# Patient Record
Sex: MALE | Race: ASIAN | NOT HISPANIC OR LATINO | URBAN - METROPOLITAN AREA
[De-identification: names, ages, dates, MRNs, and addresses within clinical notes are randomized per-mention and may not be internally consistent; named-entity substitution may affect disease eponyms.]

---

## 2021-07-24 ENCOUNTER — INPATIENT (INPATIENT)
Facility: HOSPITAL | Age: 71
LOS: 2 days | Discharge: HOME | DRG: 313 | End: 2021-07-27
Attending: HOSPITALIST | Admitting: STUDENT IN AN ORGANIZED HEALTH CARE EDUCATION/TRAINING PROGRAM
Payer: MEDICARE

## 2021-07-24 VITALS
RESPIRATION RATE: 18 BRPM | SYSTOLIC BLOOD PRESSURE: 173 MMHG | HEIGHT: 70 IN | DIASTOLIC BLOOD PRESSURE: 94 MMHG | HEART RATE: 73 BPM | OXYGEN SATURATION: 98 % | TEMPERATURE: 99 F | WEIGHT: 197.98 LBS

## 2021-07-24 DIAGNOSIS — J30.2 OTHER SEASONAL ALLERGIC RHINITIS: ICD-10-CM

## 2021-07-24 DIAGNOSIS — R07.9 CHEST PAIN, UNSPECIFIED: ICD-10-CM

## 2021-07-24 DIAGNOSIS — Z79.899 OTHER LONG TERM (CURRENT) DRUG THERAPY: ICD-10-CM

## 2021-07-24 DIAGNOSIS — Z29.9 ENCOUNTER FOR PROPHYLACTIC MEASURES, UNSPECIFIED: ICD-10-CM

## 2021-07-24 LAB
ALBUMIN SERPL ELPH-MCNC: 4.4 G/DL — SIGNIFICANT CHANGE UP (ref 3.3–5)
ALP SERPL-CCNC: 63 U/L — SIGNIFICANT CHANGE UP (ref 40–120)
ALT FLD-CCNC: 13 U/L — SIGNIFICANT CHANGE UP (ref 10–45)
ANION GAP SERPL CALC-SCNC: 13 MMOL/L — SIGNIFICANT CHANGE UP (ref 5–17)
AST SERPL-CCNC: 14 U/L — SIGNIFICANT CHANGE UP (ref 10–40)
BASOPHILS # BLD AUTO: 0.05 K/UL — SIGNIFICANT CHANGE UP (ref 0–0.2)
BASOPHILS NFR BLD AUTO: 0.7 % — SIGNIFICANT CHANGE UP (ref 0–2)
BILIRUB SERPL-MCNC: 0.2 MG/DL — SIGNIFICANT CHANGE UP (ref 0.2–1.2)
BUN SERPL-MCNC: 10 MG/DL — SIGNIFICANT CHANGE UP (ref 7–23)
CALCIUM SERPL-MCNC: 9.9 MG/DL — SIGNIFICANT CHANGE UP (ref 8.4–10.5)
CHLORIDE SERPL-SCNC: 102 MMOL/L — SIGNIFICANT CHANGE UP (ref 96–108)
CK MB BLD-MCNC: 2.6 % — SIGNIFICANT CHANGE UP (ref 0–3.5)
CK MB CFR SERPL CALC: 4.2 NG/ML — SIGNIFICANT CHANGE UP (ref 0–6.7)
CK SERPL-CCNC: 162 U/L — SIGNIFICANT CHANGE UP (ref 30–200)
CO2 SERPL-SCNC: 23 MMOL/L — SIGNIFICANT CHANGE UP (ref 22–31)
CREAT SERPL-MCNC: 0.91 MG/DL — SIGNIFICANT CHANGE UP (ref 0.5–1.3)
EOSINOPHIL # BLD AUTO: 0.24 K/UL — SIGNIFICANT CHANGE UP (ref 0–0.5)
EOSINOPHIL NFR BLD AUTO: 3.3 % — SIGNIFICANT CHANGE UP (ref 0–6)
GLUCOSE SERPL-MCNC: 120 MG/DL — HIGH (ref 70–99)
HCT VFR BLD CALC: 43.4 % — SIGNIFICANT CHANGE UP (ref 39–50)
HGB BLD-MCNC: 13.6 G/DL — SIGNIFICANT CHANGE UP (ref 13–17)
IMM GRANULOCYTES NFR BLD AUTO: 0.4 % — SIGNIFICANT CHANGE UP (ref 0–1.5)
LYMPHOCYTES # BLD AUTO: 1.1 K/UL — SIGNIFICANT CHANGE UP (ref 1–3.3)
LYMPHOCYTES # BLD AUTO: 15.2 % — SIGNIFICANT CHANGE UP (ref 13–44)
MAGNESIUM SERPL-MCNC: 2.1 MG/DL — SIGNIFICANT CHANGE UP (ref 1.6–2.6)
MCHC RBC-ENTMCNC: 25.4 PG — LOW (ref 27–34)
MCHC RBC-ENTMCNC: 31.3 GM/DL — LOW (ref 32–36)
MCV RBC AUTO: 81 FL — SIGNIFICANT CHANGE UP (ref 80–100)
MONOCYTES # BLD AUTO: 0.7 K/UL — SIGNIFICANT CHANGE UP (ref 0–0.9)
MONOCYTES NFR BLD AUTO: 9.7 % — SIGNIFICANT CHANGE UP (ref 2–14)
NEUTROPHILS # BLD AUTO: 5.12 K/UL — SIGNIFICANT CHANGE UP (ref 1.8–7.4)
NEUTROPHILS NFR BLD AUTO: 70.7 % — SIGNIFICANT CHANGE UP (ref 43–77)
NRBC # BLD: 0 /100 WBCS — SIGNIFICANT CHANGE UP (ref 0–0)
NT-PROBNP SERPL-SCNC: 19 PG/ML — SIGNIFICANT CHANGE UP (ref 0–300)
PLATELET # BLD AUTO: 247 K/UL — SIGNIFICANT CHANGE UP (ref 150–400)
POTASSIUM SERPL-MCNC: 4.6 MMOL/L — SIGNIFICANT CHANGE UP (ref 3.5–5.3)
POTASSIUM SERPL-SCNC: 4.6 MMOL/L — SIGNIFICANT CHANGE UP (ref 3.5–5.3)
PROT SERPL-MCNC: 7.3 G/DL — SIGNIFICANT CHANGE UP (ref 6–8.3)
RBC # BLD: 5.36 M/UL — SIGNIFICANT CHANGE UP (ref 4.2–5.8)
RBC # FLD: 14.9 % — HIGH (ref 10.3–14.5)
SARS-COV-2 RNA SPEC QL NAA+PROBE: SIGNIFICANT CHANGE UP
SODIUM SERPL-SCNC: 138 MMOL/L — SIGNIFICANT CHANGE UP (ref 135–145)
TROPONIN T, HIGH SENSITIVITY RESULT: <6 NG/L — SIGNIFICANT CHANGE UP (ref 0–51)
TROPONIN T, HIGH SENSITIVITY RESULT: <6 NG/L — SIGNIFICANT CHANGE UP (ref 0–51)
WBC # BLD: 7.24 K/UL — SIGNIFICANT CHANGE UP (ref 3.8–10.5)
WBC # FLD AUTO: 7.24 K/UL — SIGNIFICANT CHANGE UP (ref 3.8–10.5)

## 2021-07-24 PROCEDURE — 71045 X-RAY EXAM CHEST 1 VIEW: CPT | Mod: 26

## 2021-07-24 PROCEDURE — 99285 EMERGENCY DEPT VISIT HI MDM: CPT | Mod: 25

## 2021-07-24 PROCEDURE — 99222 1ST HOSP IP/OBS MODERATE 55: CPT

## 2021-07-24 PROCEDURE — 93010 ELECTROCARDIOGRAM REPORT: CPT

## 2021-07-24 RX ORDER — OMEPRAZOLE 10 MG/1
1 CAPSULE, DELAYED RELEASE ORAL
Qty: 0 | Refills: 0 | DISCHARGE

## 2021-07-24 RX ORDER — ASPIRIN/CALCIUM CARB/MAGNESIUM 324 MG
162 TABLET ORAL ONCE
Refills: 0 | Status: COMPLETED | OUTPATIENT
Start: 2021-07-24 | End: 2021-07-24

## 2021-07-24 RX ORDER — MONTELUKAST 4 MG/1
10 TABLET, CHEWABLE ORAL DAILY
Refills: 0 | Status: DISCONTINUED | OUTPATIENT
Start: 2021-07-24 | End: 2021-07-27

## 2021-07-24 RX ORDER — BUDESONIDE AND FORMOTEROL FUMARATE DIHYDRATE 160; 4.5 UG/1; UG/1
2 AEROSOL RESPIRATORY (INHALATION)
Refills: 0 | Status: DISCONTINUED | OUTPATIENT
Start: 2021-07-24 | End: 2021-07-27

## 2021-07-24 RX ORDER — PANTOPRAZOLE SODIUM 20 MG/1
40 TABLET, DELAYED RELEASE ORAL
Refills: 0 | Status: DISCONTINUED | OUTPATIENT
Start: 2021-07-24 | End: 2021-07-27

## 2021-07-24 RX ORDER — FLUTICASONE FUROATE, UMECLIDINIUM BROMIDE AND VILANTEROL TRIFENATATE 200; 62.5; 25 UG/1; UG/1; UG/1
1 POWDER RESPIRATORY (INHALATION)
Qty: 0 | Refills: 0 | DISCHARGE

## 2021-07-24 RX ORDER — MONTELUKAST 4 MG/1
1 TABLET, CHEWABLE ORAL
Qty: 0 | Refills: 0 | DISCHARGE

## 2021-07-24 RX ORDER — ONDANSETRON 8 MG/1
4 TABLET, FILM COATED ORAL EVERY 8 HOURS
Refills: 0 | Status: DISCONTINUED | OUTPATIENT
Start: 2021-07-24 | End: 2021-07-27

## 2021-07-24 RX ORDER — TIOTROPIUM BROMIDE 18 UG/1
1 CAPSULE ORAL; RESPIRATORY (INHALATION) DAILY
Refills: 0 | Status: DISCONTINUED | OUTPATIENT
Start: 2021-07-24 | End: 2021-07-27

## 2021-07-24 RX ORDER — ENOXAPARIN SODIUM 100 MG/ML
40 INJECTION SUBCUTANEOUS AT BEDTIME
Refills: 0 | Status: DISCONTINUED | OUTPATIENT
Start: 2021-07-24 | End: 2021-07-27

## 2021-07-24 RX ORDER — ACETAMINOPHEN 500 MG
650 TABLET ORAL EVERY 6 HOURS
Refills: 0 | Status: DISCONTINUED | OUTPATIENT
Start: 2021-07-24 | End: 2021-07-26

## 2021-07-24 RX ORDER — LANOLIN ALCOHOL/MO/W.PET/CERES
3 CREAM (GRAM) TOPICAL AT BEDTIME
Refills: 0 | Status: DISCONTINUED | OUTPATIENT
Start: 2021-07-24 | End: 2021-07-27

## 2021-07-24 RX ADMIN — MONTELUKAST 10 MILLIGRAM(S): 4 TABLET, CHEWABLE ORAL at 22:55

## 2021-07-24 RX ADMIN — TIOTROPIUM BROMIDE 1 CAPSULE(S): 18 CAPSULE ORAL; RESPIRATORY (INHALATION) at 22:59

## 2021-07-24 RX ADMIN — ENOXAPARIN SODIUM 40 MILLIGRAM(S): 100 INJECTION SUBCUTANEOUS at 22:54

## 2021-07-24 RX ADMIN — BUDESONIDE AND FORMOTEROL FUMARATE DIHYDRATE 2 PUFF(S): 160; 4.5 AEROSOL RESPIRATORY (INHALATION) at 22:55

## 2021-07-24 RX ADMIN — PANTOPRAZOLE SODIUM 40 MILLIGRAM(S): 20 TABLET, DELAYED RELEASE ORAL at 12:32

## 2021-07-24 RX ADMIN — Medication 162 MILLIGRAM(S): at 03:44

## 2021-07-24 NOTE — ED ADULT TRIAGE NOTE - CHIEF COMPLAINT QUOTE
chest pain on and off x 24 hours.  patient says worse with palpation but woke up sweating tonight so became concerned

## 2021-07-24 NOTE — ED ADULT NURSE NOTE - OBJECTIVE STATEMENT
70 y old male with no significant history presents to the ED from home c/o chest pain x 1.5 hours. Pt reports chest pain woke him from his sleep and he was diaphoretic. Pt states pain is left sided and nonradiating. Denies HA, fevers, chills, SOB, abd pain, n/v/d, weakness. Pt A&O x 4, ambulatory. Respirations spontaneous and unlabored. Abd soft, nontender and nondistended. Peripheral pulses strong. Skin warm, dry and intact. Bed in lowest position, side rails up and call bell in reach. Pt placed on cardiac monitor.

## 2021-07-24 NOTE — ED PROVIDER NOTE - NS ED ROS FT
Gen: Denies fever, chills  CV: +chest pain  Skin: Denies rash, erythema  Resp: Denies SOB, cough  ENT: Denies nasal congestion  Eyes: Denies blurry vision  GI: Denies nausea, vomiting, diarrhea  Msk: Denies LE swelling  : Denies dysuria  Neuro: Denies headache

## 2021-07-24 NOTE — CONSULT NOTE ADULT - SUBJECTIVE AND OBJECTIVE BOX
CHIEF COMPLAINT:Patient is a 70y old  Male who presents with a chief complaint of chest pain on and off x 24 hours (24 Jul 2021 09:08)      HISTORY OF PRESENT ILLNESS:HPI:  71 yo M with no sig pmhx presents with 2ds of intermittent chest pain, woke up with chest pain and diaphoresis. Pain improving at presentation.  As per patient he was doing well, performing his ADL until 2 days ago when he noticed a left sided pressure like pain rated 2/10 which lasted for 1-3 sec that was recurrent and associated with diaphoresis .  NO nausea, vomiting or LUE numbness .  NO pain radiation, pain as he states is worse with pressure on the area.  Pt states that he had chest pain in 9/2020 and had a neg stress echo and was told to see a pulm who in term prescribed him Trelegy and singulair.  NO dyspnea no palpitations    Last stress and echo was about a yr ago - normal. No fever, chills, abd pain, nausea, vomiting, diarrhea. did not take asa.      ED : Patient received ASA 162mg  (24 Jul 2021 09:08)      PAST MEDICAL & SURGICAL HISTORY:          MEDICATIONS:  enoxaparin Injectable 40 milliGRAM(s) SubCutaneous at bedtime      budesonide  80 MICROgram(s)/formoterol 4.5 MICROgram(s) Inhaler 2 Puff(s) Inhalation two times a day  montelukast 10 milliGRAM(s) Oral daily  tiotropium 18 MICROgram(s) Capsule 1 Capsule(s) Inhalation daily    acetaminophen   Tablet .. 650 milliGRAM(s) Oral every 6 hours PRN  melatonin 3 milliGRAM(s) Oral at bedtime PRN  ondansetron Injectable 4 milliGRAM(s) IV Push every 8 hours PRN    aluminum hydroxide/magnesium hydroxide/simethicone Suspension 30 milliLiter(s) Oral every 4 hours PRN  pantoprazole    Tablet 40 milliGRAM(s) Oral before breakfast          FAMILY HISTORY:      Non-contributory    SOCIAL HISTORY:    [ ] Tobacco  [ ] Drugs  [ ] Alcohol    Allergies    No Known Allergies    Intolerances    	    REVIEW OF SYSTEMS:  CONSTITUTIONAL: No fever  EYES: No eye pain, visual disturbances, or discharge  ENMT:  No difficulty hearing, tinnitus  NECK: No pain or stiffness  RESPIRATORY: No cough, wheezing,  CARDIOVASCULAR: No chest pain, palpitations, passing out, dizziness, or leg swelling  GASTROINTESTINAL:  No nausea, vomiting, diarrhea or constipation. No melena.  GENITOURINARY: No dysuria, hematuria  NEUROLOGICAL: No stroke like symptoms  SKIN: No burning or lesions   ENDOCRINE: No heat or cold intolerance  MUSCULOSKELETAL: No joint pain or swelling  PSYCHIATRIC: No  anxiety, mood swings  HEME/LYMPH: No bleeding gums  ALLERGY AND IMMUNOLOGIC: No hives or eczema	    All other ROS negative    PHYSICAL EXAM:  T(C): 36.8 (07-24-21 @ 11:53), Max: 37.1 (07-24-21 @ 02:01)  HR: 97 (07-24-21 @ 11:53) (58 - 97)  BP: 136/81 (07-24-21 @ 11:53) (136/81 - 173/94)  RR: 18 (07-24-21 @ 11:53) (14 - 18)  SpO2: 99% (07-24-21 @ 11:53) (98% - 99%)  Wt(kg): --  I&O's Summary      Appearance: Normal	  HEENT:   Normal oral mucosa, EOMI	  Cardiovascular:  S1 S2, No JVD,    Respiratory: Lungs clear to auscultation	  Psychiatry: Alert  Gastrointestinal:  Soft, Non-tender, + BS	  Skin: No rashes   Neurologic: Non-focal  Extremities:  No edema  Vascular: Peripheral pulses palpable    	    	  	  CARDIAC MARKERS:  Labs personally reviewed by me                                  13.6   7.24  )-----------( 247      ( 24 Jul 2021 03:46 )             43.4     07-24    138  |  102  |  10  ----------------------------<  120<H>  4.6   |  23  |  0.91    Ca    9.9      24 Jul 2021 03:46  Mg     2.1     07-24    TPro  7.3  /  Alb  4.4  /  TBili  0.2  /  DBili  x   /  AST  14  /  ALT  13  /  AlkPhos  63  07-24          EKG: Personally reviewed by me -   Radiology: Personally reviewed by me -   < from: Xray Chest 1 View- PORTABLE-Urgent (07.24.21 @ 03:59) >  IMPRESSION:  Clear lungs.    < end of copied text >      Assessment /Plan:         Dayana Snyedr [facility]   Michael Esquivel DO [facility]  Cardiovascular Medicine  [address] [place] [address], [address]  Office [phone]  Cell [phone] CHIEF COMPLAINT:Patient is a 70y old  Male who presents with a chief complaint of chest pain on and off x 24 hours (24 Jul 2021 09:08)      HISTORY OF PRESENT ILLNESS:HPI:  71 yo M with no sig pmhx presents with 2ds of intermittent chest pain, woke up with chest pain and diaphoresis. Pain improving at presentation.  As per patient he was doing well, performing his ADL until 2 days ago when he noticed a left sided pressure like pain rated 2/10 which lasted for 1-3 sec that was recurrent and associated with diaphoresis .  NO nausea, vomiting or LUE numbness .  NO pain radiation, pain as he states is worse with pressure on the area.  Pt states that he had chest pain in 9/2020 and had a neg stress echo and was told to see a pulm who in term prescribed him Trelegy and singulair.  NO dyspnea no palpitations    Last stress and echo was about a yr ago - normal. No fever, chills, abd pain, nausea, vomiting, diarrhea. did not take asa.      ED : Patient received ASA 162mg  (24 Jul 2021 09:08)      PAST MEDICAL & SURGICAL HISTORY:          MEDICATIONS:  enoxaparin Injectable 40 milliGRAM(s) SubCutaneous at bedtime      budesonide  80 MICROgram(s)/formoterol 4.5 MICROgram(s) Inhaler 2 Puff(s) Inhalation two times a day  montelukast 10 milliGRAM(s) Oral daily  tiotropium 18 MICROgram(s) Capsule 1 Capsule(s) Inhalation daily    acetaminophen   Tablet .. 650 milliGRAM(s) Oral every 6 hours PRN  melatonin 3 milliGRAM(s) Oral at bedtime PRN  ondansetron Injectable 4 milliGRAM(s) IV Push every 8 hours PRN    aluminum hydroxide/magnesium hydroxide/simethicone Suspension 30 milliLiter(s) Oral every 4 hours PRN  pantoprazole    Tablet 40 milliGRAM(s) Oral before breakfast          FAMILY HISTORY:      Non-contributory    SOCIAL HISTORY:    [ ] Tobacco  [ ] Drugs  [ ] Alcohol    Allergies    No Known Allergies    Intolerances    	    REVIEW OF SYSTEMS:  CONSTITUTIONAL: No fever  EYES: No eye pain, visual disturbances, or discharge  ENMT:  No difficulty hearing, tinnitus  NECK: No pain or stiffness  RESPIRATORY: No cough, wheezing,  CARDIOVASCULAR: No chest pain, palpitations, passing out, dizziness, or leg swelling  GASTROINTESTINAL:  No nausea, vomiting, diarrhea or constipation. No melena.  GENITOURINARY: No dysuria, hematuria  NEUROLOGICAL: No stroke like symptoms  SKIN: No burning or lesions   ENDOCRINE: No heat or cold intolerance  MUSCULOSKELETAL: No joint pain or swelling  PSYCHIATRIC: No  anxiety, mood swings  HEME/LYMPH: No bleeding gums  ALLERGY AND IMMUNOLOGIC: No hives or eczema	    All other ROS negative    PHYSICAL EXAM:  T(C): 36.8 (07-24-21 @ 11:53), Max: 37.1 (07-24-21 @ 02:01)  HR: 97 (07-24-21 @ 11:53) (58 - 97)  BP: 136/81 (07-24-21 @ 11:53) (136/81 - 173/94)  RR: 18 (07-24-21 @ 11:53) (14 - 18)  SpO2: 99% (07-24-21 @ 11:53) (98% - 99%)  Wt(kg): --  I&O's Summary      Appearance: Normal	  HEENT:   Normal oral mucosa, EOMI	  Cardiovascular:  S1 S2, No JVD,    Respiratory: Lungs clear to auscultation	  Psychiatry: Alert  Gastrointestinal:  Soft, Non-tender, + BS	  Skin: No rashes   Neurologic: Non-focal  Extremities:  No edema  Vascular: Peripheral pulses palpable    	    	  	  CARDIAC MARKERS:  Labs personally reviewed by me                                  13.6   7.24  )-----------( 247      ( 24 Jul 2021 03:46 )             43.4     07-24    138  |  102  |  10  ----------------------------<  120<H>  4.6   |  23  |  0.91    Ca    9.9      24 Jul 2021 03:46  Mg     2.1     07-24    TPro  7.3  /  Alb  4.4  /  TBili  0.2  /  DBili  x   /  AST  14  /  ALT  13  /  AlkPhos  63  07-24          EKG: Personally reviewed by me - NSR  Radiology: Personally reviewed by me -   < from: Xray Chest 1 View- PORTABLE-Urgent (07.24.21 @ 03:59) >  IMPRESSION:  Clear lungs.          Assessment /Plan:   71 yo male with hx of GERD presents with c/o of CP started 2 days ago intermittently. However last night CP would not go away and was increasing so he decided to come to ER. He woke up with chest pain and diaphoresis and no other associated symptoms. per pt and son he has been getting episodes of chest pain on and off since last year for which he had workup done 10/2020. Stress test was done at [place] by Dr. Giovani Zuluaga and was negative. Pt was later on referred to pulmonologist who prescribed him Trelegy and singulair. Pt denies any cardiac history, family hx is negative as well. He never smoked and is active at home with no SOB.  Currently pt reports reproducible CP upon pressure located at 6th intercostal by mid-axillary rating 3/10, no N/V, radiation, palpitation, SOB, cough, fever diaphoreis.  NO pain radiation, pain as he states is worse with pressure on the area. Pt reports eating well balanced meals with vegetables, no redmeat intake and no hx of HDL. He also is on daily omeprazole which he didnt miss.        Problem/Plan - 1:  ·  Problem: Chest pain Plan:   - Trop neg sio far   -EKG notted NSR   -hold off NSAIDS for now   - rec CT coronaries give metoprolol 50mg po 1 hr prior to CT of coronaries   -rec TTE to assess ischemia    Problem/Plan - 2:  ·  Problem: Preventive measure.  Plan: Lovenox SQ       Dayana Snyder [facility]   Michael Esquivel DO [facility]  Cardiovascular Medicine  [address] [place] [address], [address]  Office [phone]  Cell [phone] CHIEF COMPLAINT:Patient is a 70y old  Male who presents with a chief complaint of chest pain on and off x 24 hours (24 Jul 2021 09:08)      HISTORY OF PRESENT ILLNESS:HPI:  69 yo M with no sig pmhx presents with 2ds of intermittent chest pain, woke up with chest pain and diaphoresis. Pain improving at presentation.  As per patient he was doing well, performing his ADL until 2 days ago when he noticed a left sided pressure like pain rated 2/10 which lasted for 1-3 sec that was recurrent and associated with diaphoresis .  NO nausea, vomiting or LUE numbness .  NO pain radiation, pain as he states is worse with pressure on the area.  Pt states that he had chest pain in 9/2020 and had a neg stress echo and was told to see a pulm who in term prescribed him Trelegy and singulair.  NO dyspnea no palpitations    Last stress and echo was about a yr ago - normal. No fever, chills, abd pain, nausea, vomiting, diarrhea. did not take asa.      ED : Patient received ASA 162mg  (24 Jul 2021 09:08)      PAST MEDICAL & SURGICAL HISTORY:          MEDICATIONS:  enoxaparin Injectable 40 milliGRAM(s) SubCutaneous at bedtime      budesonide  80 MICROgram(s)/formoterol 4.5 MICROgram(s) Inhaler 2 Puff(s) Inhalation two times a day  montelukast 10 milliGRAM(s) Oral daily  tiotropium 18 MICROgram(s) Capsule 1 Capsule(s) Inhalation daily    acetaminophen   Tablet .. 650 milliGRAM(s) Oral every 6 hours PRN  melatonin 3 milliGRAM(s) Oral at bedtime PRN  ondansetron Injectable 4 milliGRAM(s) IV Push every 8 hours PRN    aluminum hydroxide/magnesium hydroxide/simethicone Suspension 30 milliLiter(s) Oral every 4 hours PRN  pantoprazole    Tablet 40 milliGRAM(s) Oral before breakfast          FAMILY HISTORY:      Non-contributory    SOCIAL HISTORY:    [ ] Tobacco  [ ] Drugs  [ ] Alcohol    Allergies    No Known Allergies    Intolerances    	    REVIEW OF SYSTEMS:  CONSTITUTIONAL: No fever  EYES: No eye pain, visual disturbances, or discharge  ENMT:  No difficulty hearing, tinnitus  NECK: No pain or stiffness  RESPIRATORY: No cough, wheezing,  CARDIOVASCULAR: No chest pain, palpitations, passing out, dizziness, or leg swelling  GASTROINTESTINAL:  No nausea, vomiting, diarrhea or constipation. No melena.  GENITOURINARY: No dysuria, hematuria  NEUROLOGICAL: No stroke like symptoms  SKIN: No burning or lesions   ENDOCRINE: No heat or cold intolerance  MUSCULOSKELETAL: No joint pain or swelling  PSYCHIATRIC: No  anxiety, mood swings  HEME/LYMPH: No bleeding gums  ALLERGY AND IMMUNOLOGIC: No hives or eczema	    All other ROS negative    PHYSICAL EXAM:  T(C): 36.8 (07-24-21 @ 11:53), Max: 37.1 (07-24-21 @ 02:01)  HR: 97 (07-24-21 @ 11:53) (58 - 97)  BP: 136/81 (07-24-21 @ 11:53) (136/81 - 173/94)  RR: 18 (07-24-21 @ 11:53) (14 - 18)  SpO2: 99% (07-24-21 @ 11:53) (98% - 99%)  Wt(kg): --  I&O's Summary      Appearance: Normal	  HEENT:   Normal oral mucosa, EOMI	  Cardiovascular:  S1 S2, No JVD,    Respiratory: Lungs clear to auscultation	  Psychiatry: Alert  Gastrointestinal:  Soft, Non-tender, + BS	  Skin: No rashes   Neurologic: Non-focal  Extremities:  No edema  Vascular: Peripheral pulses palpable    	    	  	  CARDIAC MARKERS:  Labs personally reviewed by me                                  13.6   7.24  )-----------( 247      ( 24 Jul 2021 03:46 )             43.4     07-24    138  |  102  |  10  ----------------------------<  120<H>  4.6   |  23  |  0.91    Ca    9.9      24 Jul 2021 03:46  Mg     2.1     07-24    TPro  7.3  /  Alb  4.4  /  TBili  0.2  /  DBili  x   /  AST  14  /  ALT  13  /  AlkPhos  63  07-24          EKG: Personally reviewed by me - NSR  Radiology: Personally reviewed by me -   < from: Xray Chest 1 View- PORTABLE-Urgent (07.24.21 @ 03:59) >  IMPRESSION:  Clear lungs.          Assessment /Plan:   69 yo male with hx of GERD presents with c/o of CP started 2 days ago intermittently. However last night CP would not go away and was increasing so he decided to come to ER. He woke up with chest pain and diaphoresis and no other associated symptoms. per pt and son he has been getting episodes of chest pain on and off since last year for which he had workup done 10/2020. Stress test was done at [place] by Dr. Giovani Zuluaga and was negative. Pt was later on referred to pulmonologist who prescribed him Trelegy and singulair. Pt denies any cardiac history, family hx is negative as well. He never smoked and is active at home with no SOB.  Currently pt reports reproducible CP upon pressure located at 6th intercostal by mid-axillary rating 3/10, no N/V, radiation, palpitation, SOB, cough, fever diaphoreis.  NO pain radiation, pain as he states is worse with pressure on the area. Pt reports eating well balanced meals with vegetables, no redmeat intake and no hx of HDL. He also is on daily omeprazole which he didnt miss.        Problem/Plan - 1:  ·  Problem: Chest pain Plan:   - Trop neg so far   -EKG noted NSR   -hold off NSAIDS for now   -rec TTE to assess ischemia  - rec CT coronaries give metoprolol 50mg po 1 hr prior to CT of coronaries       Problem/Plan - 2:  ·  Problem: Preventive measure.  Plan: Lovenox SQ       Dayana Snyder [facility]   Michael Esquivel DO [facility]  Cardiovascular Medicine  [address] [place] [address], [address]  Office [phone]  Cell [phone] CHIEF COMPLAINT:Patient is a 70y old  Male who presents with a chief complaint of chest pain on and off x 24 hours (24 Jul 2021 09:08)      HISTORY OF PRESENT ILLNESS:HPI:  71 yo M with no sig pmhx presents with 2ds of intermittent chest pain, woke up with chest pain and diaphoresis. Pain improving at presentation.  As per patient he was doing well, performing his ADL until 2 days ago when he noticed a left sided pressure like pain rated 2/10 which lasted for 1-3 sec that was recurrent and associated with diaphoresis .  NO nausea, vomiting or LUE numbness .  NO pain radiation, pain as he states is worse with pressure on the area.  Pt states that he had chest pain in 9/2020 and had a neg stress echo and was told to see a pulm who in term prescribed him Trelegy and singulair.  NO dyspnea no palpitations    Last stress and echo was about a yr ago - normal. No fever, chills, abd pain, nausea, vomiting, diarrhea. did not take asa.      ED : Patient received ASA 162mg  (24 Jul 2021 09:08)      PAST MEDICAL & SURGICAL HISTORY:          MEDICATIONS:  enoxaparin Injectable 40 milliGRAM(s) SubCutaneous at bedtime      budesonide  80 MICROgram(s)/formoterol 4.5 MICROgram(s) Inhaler 2 Puff(s) Inhalation two times a day  montelukast 10 milliGRAM(s) Oral daily  tiotropium 18 MICROgram(s) Capsule 1 Capsule(s) Inhalation daily    acetaminophen   Tablet .. 650 milliGRAM(s) Oral every 6 hours PRN  melatonin 3 milliGRAM(s) Oral at bedtime PRN  ondansetron Injectable 4 milliGRAM(s) IV Push every 8 hours PRN    aluminum hydroxide/magnesium hydroxide/simethicone Suspension 30 milliLiter(s) Oral every 4 hours PRN  pantoprazole    Tablet 40 milliGRAM(s) Oral before breakfast          FAMILY HISTORY:      Non-contributory    SOCIAL HISTORY:    [ ] Tobacco  [ ] Drugs  [ ] Alcohol    Allergies    No Known Allergies    Intolerances    	    REVIEW OF SYSTEMS:  CONSTITUTIONAL: No fever  EYES: No eye pain, visual disturbances, or discharge  ENMT:  No difficulty hearing, tinnitus  NECK: No pain or stiffness  RESPIRATORY: No cough, wheezing,  CARDIOVASCULAR: No chest pain, palpitations, passing out, dizziness, or leg swelling  GASTROINTESTINAL:  No nausea, vomiting, diarrhea or constipation. No melena.  GENITOURINARY: No dysuria, hematuria  NEUROLOGICAL: No stroke like symptoms  SKIN: No burning or lesions   ENDOCRINE: No heat or cold intolerance  MUSCULOSKELETAL: No joint pain or swelling  PSYCHIATRIC: No  anxiety, mood swings  HEME/LYMPH: No bleeding gums  ALLERGY AND IMMUNOLOGIC: No hives or eczema	    All other ROS negative    PHYSICAL EXAM:  T(C): 36.8 (07-24-21 @ 11:53), Max: 37.1 (07-24-21 @ 02:01)  HR: 97 (07-24-21 @ 11:53) (58 - 97)  BP: 136/81 (07-24-21 @ 11:53) (136/81 - 173/94)  RR: 18 (07-24-21 @ 11:53) (14 - 18)  SpO2: 99% (07-24-21 @ 11:53) (98% - 99%)  Wt(kg): --  I&O's Summary      Appearance: Normal	  HEENT:   Normal oral mucosa, EOMI	  Cardiovascular:  S1 S2, No JVD,    Respiratory: Lungs clear to auscultation	  Psychiatry: Alert  Gastrointestinal:  Soft, Non-tender, + BS	  Skin: No rashes   Neurologic: Non-focal  Extremities:  No edema  Vascular: Peripheral pulses palpable    	    	  	  CARDIAC MARKERS:  Labs personally reviewed by me                                  13.6   7.24  )-----------( 247      ( 24 Jul 2021 03:46 )             43.4     07-24    138  |  102  |  10  ----------------------------<  120<H>  4.6   |  23  |  0.91    Ca    9.9      24 Jul 2021 03:46  Mg     2.1     07-24    TPro  7.3  /  Alb  4.4  /  TBili  0.2  /  DBili  x   /  AST  14  /  ALT  13  /  AlkPhos  63  07-24          EKG: Personally reviewed by me - NSR  Radiology: Personally reviewed by me -   < from: Xray Chest 1 View- PORTABLE-Urgent (07.24.21 @ 03:59) >  IMPRESSION:  Clear lungs.          Assessment /Plan:   71 yo male with hx of GERD presents with c/o of CP started 2 days ago intermittently. However last night CP would not go away and was increasing so he decided to come to ER. He woke up with chest pain and diaphoresis and no other associated symptoms. per pt and son he has been getting episodes of chest pain on and off since last year for which he had workup done 10/2020. Stress test was done at [place] by Dr. Giovani Zuluaga and was negative. Pt was later on referred to pulmonologist who prescribed him Trelegy and singulair. Pt denies any cardiac history, family hx is negative as well. He never smoked and is active at home with no SOB.  Currently pt reports reproducible CP upon pressure located at 6th intercostal by mid-axillary rating 3/10, no N/V, radiation, palpitation, SOB, cough, fever diaphoreis.  NO pain radiation, pain as he states is worse with pressure on the area. Pt reports eating well balanced meals with vegetables, no redmeat intake and no hx of HDL. He also is on daily omeprazole which he didnt miss.        Problem/Plan - 1:  ·  Problem: Chest pain Plan:   - Trop neg so far   -EKG noted NSR   -hold off NSAIDS for now   -rec TTE to assess LVEF and for WMA  - rec CT coronaries to define anatomy  -- give metoprolol 50mg po 1 hr prior to CT of coronaries for goal HR in the 60s for CT      Problem/Plan - 2:  ·  Problem: Preventive measure.  Plan: Lovenox SQ       Advanced care planning/advanced directives discussed with patient/family. DNR status including forceful chest compressions to attempt to restart the heart, ventilator support/artificial breathing, electric shock, artificial nutrition, health care proxy, Molst form all discussed with pt. Pt wishes to consider.  More than fifteen minutes spent on discussing advanced directives. OMT on six regions for acute somatic dysfunctions done at the bedside         Dayana Snyder [facility]   Michael Esquivel DO [facility]  Cardiovascular Medicine  [address], [address]  Office [phone]  Cell [phone]

## 2021-07-24 NOTE — ED PROVIDER NOTE - CLINICAL SUMMARY MEDICAL DECISION MAKING FREE TEXT BOX
Concern for ACS - asa/labs/cxr. EKG - no andrea/std. if negative - admit for stress test. If develops chest pain again - will repeat ekg

## 2021-07-24 NOTE — ED ADULT NURSE NOTE - NSIMPLEMENTINTERV_GEN_ALL_ED
Implemented All Universal Safety Interventions:  [place] to call system. Call bell, personal items and telephone within reach. Instruct patient to call for assistance. Room bathroom lighting operational. Non-slip footwear when patient is off stretcher. Physically safe environment: no spills, clutter or unnecessary equipment. Stretcher in lowest position, wheels locked, appropriate side rails in place.

## 2021-07-24 NOTE — H&P ADULT - PROBLEM SELECTOR PLAN 4
Pt states that he is taking Trelegy for some pulm issues and he is being followed by a pulm specialist   f/up as outpatient       Might be a DC on 7/25 if CT coronaries is non revealing and is his symptoms are stable

## 2021-07-24 NOTE — ED PROVIDER NOTE - PHYSICAL EXAMINATION
Gen: non toxic appearing, NAD   Head: NC/NT  Eyes:   anicteric  ENT: airway patent, mmm   CV: RRR, +S1/S2, no M/R/G, symmetric pulses   Resp: CTAB, symmetric breath sounds  GI:  abdomen soft non-distended, NTTP  Back: no CVA tenderness  Extremities -   no edema  Skin: no rashes, colors changes or bruising of chest  Neuro: A&Ox4, following commands, speech clear, moving all four extremities spontaneously   Psych: appropriate mood, normal insight

## 2021-07-24 NOTE — H&P ADULT - NSHPLABSRESULTS_GEN_ALL_CORE
Lab results are reviewed by me Lab results are reviewed by me  : WBC = 7.24  H/H = 13.6/43.4  PLT = 247  CXR clear  Trop Neg Cr = 0.91

## 2021-07-24 NOTE — H&P ADULT - HISTORY OF PRESENT ILLNESS
69 yo M with no sig pmhx presents with 2ds of intermittent chest pain, woke up with chest pain and diaphoresis. Pain improving at presentation. Last stress and echo was about a yr ago - normal. No fever, chills, abd pain, nausea, vomiting, diarrhea. did not take asa.      ED : Patient received ASA 162mg  69 yo M with no sig pmhx presents with 2ds of intermittent chest pain, woke up with chest pain and diaphoresis. Pain improving at presentation.  As per patient he was doing well, performing his ADL until 2 days ago when he noticed a left sided pressure like pain rated 2/10 which lasted for 1-3 sec that was recurrent and associated with diaphoresis .  NO nausea, vomiting or LUE numbness .  NO pain radiation, pain as he states is worse with pressure on the area.  Pt states that he had chest pain in 9/2020 and had a neg stress echo and was told to see a pulm who in term prescribed him Trelegy and singulair.  NO dyspnea no palpitations    Last stress and echo was about a yr ago - normal. No fever, chills, abd pain, nausea, vomiting, diarrhea. did not take asa.      ED : Patient received ASA 162mg  71 yo M with no reported sig pmhx ( but was taking meds for unspecified Pulm conditions presents with 2ds of intermittent chest pain, woke up with chest pain and diaphoresis. Pain improving at presentation.  As per patient he was doing well, performing his ADL until 2 days ago when he noticed a left sided pressure like pain rated 2/10 which lasted for 1-3 sec that was recurrent and associated with diaphoresis .  NO nausea, vomiting or LUE numbness .  NO pain radiation, pain as he states is worse with pressure on the area.  Pt states that he had chest pain in 9/2020 and had a neg stress echo and was told to see a pulm who in term prescribed him Trelegy and singulair.  NO dyspnea no palpitations    Last stress and echo was about a yr ago - normal. No fever, chills, abd pain, nausea, vomiting, diarrhea. did not take asa.      ED : Patient received ASA 162mg  71 yo M with pmhx of allergic rhinitis, and  unspecified Pulm conditions presents with 2days of intermittent chest pain which woke up him up from sleep and diaphoresis.  As per patient he was doing well, performing his ADL until 2 days ago when he noticed a left sided pressure like pain rated 2/10 which lasted for 1-3 sec that was recurrent and associated with diaphoresis .  NO nausea, vomiting or LUE numbness .  NO pain radiation, pain as he states is worse with pressure on the area.  Pt states that he had chest pain in 9/2020 and had a neg stress echo and was told to see a pulm who in term prescribed him Trelegy and singulair.  NO dyspnea , no palpitations , no known family of premature cardiac events.    No fever, chills, abd pain, nausea, vomiting, diarrhea, no sick contact, no cough and no leg swelling. did not take asa.      ED : Patient received ASA 162mg

## 2021-07-24 NOTE — H&P ADULT - ASSESSMENT
69 yo M with no sig pmhx presents with 2ds of intermittent chest pain, woke up with chest pain and diaphoresis. 69 yo M with no sig pmhx presents with 2ds of intermittent chest pain, woke up with chest pain and diaphoresis. Negative Troponin and no acute ST changes

## 2021-07-24 NOTE — ED PROVIDER NOTE - ATTENDING CONTRIBUTION TO CARE
Pt with no hx of CAD here with CP, constant, along with diaphoresis that started at rest. pt with mild amt of pain here now, well appearing, NAD, stable vitals. pt ekg non-dx, trop neg, cxr, unremarkable. pt did have similar sx last year and had w/u including stress/echo that was reportedly neg. while it seems low likelihood this is acs, the story is concerning given the diaphoresis so pt will stay to get ruled out and possibly see cardiology.

## 2021-07-24 NOTE — H&P ADULT - PROBLEM SELECTOR PLAN 1
Neg Trop X1 and no acute ECG changes  Will get one set of cardiac enzyme and ECG   Pain is most likely atypical but will rule out cardiac in origin   I would hold off NSAIDS for now   Tylenol as needed   ?? CT coronaries  Cardio consult   Pt cannot remember the name neither the address of the cardiologist

## 2021-07-24 NOTE — ED PROVIDER NOTE - OBJECTIVE STATEMENT
69 yo M with no sig pmhx presents with 2ds of intermittent chest pain, woke up with chest pain and diaphoresis. Pain improving at presentation. Last stress and echo was about a yr ago - normal. No fever, chills, abd pain, nausea, vomiting, diarrhea. did not take asa.

## 2021-07-25 LAB
ALBUMIN SERPL ELPH-MCNC: 4.3 G/DL — SIGNIFICANT CHANGE UP (ref 3.3–5)
ALP SERPL-CCNC: 67 U/L — SIGNIFICANT CHANGE UP (ref 40–120)
ALT FLD-CCNC: 13 U/L — SIGNIFICANT CHANGE UP (ref 10–45)
ANION GAP SERPL CALC-SCNC: 11 MMOL/L — SIGNIFICANT CHANGE UP (ref 5–17)
AST SERPL-CCNC: 14 U/L — SIGNIFICANT CHANGE UP (ref 10–40)
BASOPHILS # BLD AUTO: 0.04 K/UL — SIGNIFICANT CHANGE UP (ref 0–0.2)
BASOPHILS NFR BLD AUTO: 0.6 % — SIGNIFICANT CHANGE UP (ref 0–2)
BILIRUB SERPL-MCNC: 0.4 MG/DL — SIGNIFICANT CHANGE UP (ref 0.2–1.2)
BUN SERPL-MCNC: 9 MG/DL — SIGNIFICANT CHANGE UP (ref 7–23)
CALCIUM SERPL-MCNC: 9.8 MG/DL — SIGNIFICANT CHANGE UP (ref 8.4–10.5)
CHLORIDE SERPL-SCNC: 101 MMOL/L — SIGNIFICANT CHANGE UP (ref 96–108)
CHOLEST SERPL-MCNC: 156 MG/DL — SIGNIFICANT CHANGE UP
CO2 SERPL-SCNC: 26 MMOL/L — SIGNIFICANT CHANGE UP (ref 22–31)
COVID-19 SPIKE DOMAIN AB INTERP: POSITIVE
COVID-19 SPIKE DOMAIN ANTIBODY RESULT: >250 U/ML — HIGH
CREAT SERPL-MCNC: 0.91 MG/DL — SIGNIFICANT CHANGE UP (ref 0.5–1.3)
EOSINOPHIL # BLD AUTO: 0.24 K/UL — SIGNIFICANT CHANGE UP (ref 0–0.5)
EOSINOPHIL NFR BLD AUTO: 3.7 % — SIGNIFICANT CHANGE UP (ref 0–6)
GLUCOSE SERPL-MCNC: 107 MG/DL — HIGH (ref 70–99)
HCT VFR BLD CALC: 45.4 % — SIGNIFICANT CHANGE UP (ref 39–50)
HCV AB S/CO SERPL IA: 0.12 S/CO — SIGNIFICANT CHANGE UP (ref 0–0.99)
HCV AB SERPL-IMP: SIGNIFICANT CHANGE UP
HDLC SERPL-MCNC: 50 MG/DL — SIGNIFICANT CHANGE UP
HGB BLD-MCNC: 14 G/DL — SIGNIFICANT CHANGE UP (ref 13–17)
IMM GRANULOCYTES NFR BLD AUTO: 0.3 % — SIGNIFICANT CHANGE UP (ref 0–1.5)
LIPID PNL WITH DIRECT LDL SERPL: 88 MG/DL — SIGNIFICANT CHANGE UP
LYMPHOCYTES # BLD AUTO: 1.21 K/UL — SIGNIFICANT CHANGE UP (ref 1–3.3)
LYMPHOCYTES # BLD AUTO: 18.9 % — SIGNIFICANT CHANGE UP (ref 13–44)
MCHC RBC-ENTMCNC: 24.7 PG — LOW (ref 27–34)
MCHC RBC-ENTMCNC: 30.8 GM/DL — LOW (ref 32–36)
MCV RBC AUTO: 80.2 FL — SIGNIFICANT CHANGE UP (ref 80–100)
MONOCYTES # BLD AUTO: 0.65 K/UL — SIGNIFICANT CHANGE UP (ref 0–0.9)
MONOCYTES NFR BLD AUTO: 10.1 % — SIGNIFICANT CHANGE UP (ref 2–14)
NEUTROPHILS # BLD AUTO: 4.25 K/UL — SIGNIFICANT CHANGE UP (ref 1.8–7.4)
NEUTROPHILS NFR BLD AUTO: 66.4 % — SIGNIFICANT CHANGE UP (ref 43–77)
NON HDL CHOLESTEROL: 106 MG/DL — SIGNIFICANT CHANGE UP
NRBC # BLD: 0 /100 WBCS — SIGNIFICANT CHANGE UP (ref 0–0)
PLATELET # BLD AUTO: 248 K/UL — SIGNIFICANT CHANGE UP (ref 150–400)
POTASSIUM SERPL-MCNC: 4.6 MMOL/L — SIGNIFICANT CHANGE UP (ref 3.5–5.3)
POTASSIUM SERPL-SCNC: 4.6 MMOL/L — SIGNIFICANT CHANGE UP (ref 3.5–5.3)
PROT SERPL-MCNC: 7.1 G/DL — SIGNIFICANT CHANGE UP (ref 6–8.3)
RBC # BLD: 5.66 M/UL — SIGNIFICANT CHANGE UP (ref 4.2–5.8)
RBC # FLD: 15.2 % — HIGH (ref 10.3–14.5)
SARS-COV-2 IGG+IGM SERPL QL IA: >250 U/ML — HIGH
SARS-COV-2 IGG+IGM SERPL QL IA: POSITIVE
SODIUM SERPL-SCNC: 138 MMOL/L — SIGNIFICANT CHANGE UP (ref 135–145)
TRIGL SERPL-MCNC: 88 MG/DL — SIGNIFICANT CHANGE UP
WBC # BLD: 6.41 K/UL — SIGNIFICANT CHANGE UP (ref 3.8–10.5)
WBC # FLD AUTO: 6.41 K/UL — SIGNIFICANT CHANGE UP (ref 3.8–10.5)

## 2021-07-25 PROCEDURE — 93010 ELECTROCARDIOGRAM REPORT: CPT

## 2021-07-25 PROCEDURE — 99232 SBSQ HOSP IP/OBS MODERATE 35: CPT

## 2021-07-25 RX ORDER — METOPROLOL TARTRATE 50 MG
50 TABLET ORAL ONCE
Refills: 0 | Status: DISCONTINUED | OUTPATIENT
Start: 2021-07-25 | End: 2021-07-27

## 2021-07-25 RX ADMIN — Medication 3 MILLIGRAM(S): at 20:59

## 2021-07-25 RX ADMIN — MONTELUKAST 10 MILLIGRAM(S): 4 TABLET, CHEWABLE ORAL at 11:37

## 2021-07-25 RX ADMIN — ENOXAPARIN SODIUM 40 MILLIGRAM(S): 100 INJECTION SUBCUTANEOUS at 20:58

## 2021-07-25 RX ADMIN — TIOTROPIUM BROMIDE 1 CAPSULE(S): 18 CAPSULE ORAL; RESPIRATORY (INHALATION) at 11:37

## 2021-07-25 RX ADMIN — BUDESONIDE AND FORMOTEROL FUMARATE DIHYDRATE 2 PUFF(S): 160; 4.5 AEROSOL RESPIRATORY (INHALATION) at 20:59

## 2021-07-25 RX ADMIN — BUDESONIDE AND FORMOTEROL FUMARATE DIHYDRATE 2 PUFF(S): 160; 4.5 AEROSOL RESPIRATORY (INHALATION) at 11:37

## 2021-07-25 RX ADMIN — PANTOPRAZOLE SODIUM 40 MILLIGRAM(S): 20 TABLET, DELAYED RELEASE ORAL at 05:06

## 2021-07-25 NOTE — PROGRESS NOTE ADULT - SUBJECTIVE AND OBJECTIVE BOX
Patient is a 70y old  Male who presents with a chief complaint of chest pain on and off x 24 hours (24 Jul 2021 14:20)      INTERVAL HISTORY: pt reports feeling well     TELEMETRY Personally reviewed: SR 60'S-80'S     REVIEW OF SYSTEMS:   CONSTITUTIONAL: No weakness  EYES/ENT: No visual changes; No throat pain  Neck: No pain or stiffness  Respiratory: No cough, wheezing, No shortness of breath  CARDIOVASCULAR: no chest pain or palpitations  GASTROINTESTINAL: No abdominal pain, no nausea, vomiting or hematemesis  GENITOURINARY: No dysuria, frequency or hematuria  NEUROLOGICAL: No stroke like symptoms  SKIN: No rashes    	  MEDICATIONS:  metoprolol succinate ER 50 milliGRAM(s) Oral once        PHYSICAL EXAM:  T(C): 36.7 (07-25-21 @ 04:47), Max: 36.8 (07-24-21 @ 11:53)  HR: 68 (07-25-21 @ 04:47) (68 - 97)  BP: 132/85 (07-25-21 @ 04:47) (132/85 - 136/81)  RR: 18 (07-25-21 @ 04:47) (18 - 18)  SpO2: 98% (07-25-21 @ 04:47) (97% - 99%)  Wt(kg): --  I&O's Summary    25 Jul 2021 07:01  -  25 Jul 2021 11:11  --------------------------------------------------------  IN: 250 mL / OUT: 0 mL / NET: 250 mL          Appearance: In no distress	  HEENT:    PERRL, EOMI	  Cardiovascular:  S1 S2, No JVD  Respiratory: Lungs clear to auscultation	  Gastrointestinal:  Soft, Non-tender, + BS	  Vascularature:  No edema of LE  Psychiatric: Appropriate affect   Neuro: no acute focal deficits                               14.0   6.41  )-----------( 248      ( 25 Jul 2021 06:11 )             45.4     07-25    138  |  101  |  9   ----------------------------<  107<H>  4.6   |  26  |  0.91    Ca    9.8      25 Jul 2021 06:11  Mg     2.1     07-24    TPro  7.1  /  Alb  4.3  /  TBili  0.4  /  DBili  x   /  AST  14  /  ALT  13  /  AlkPhos  67  07-25        Labs personally reviewed      ASSESSMENT/PLAN: 	    71 yo male with hx of GERD presents with c/o of CP started 2 days ago intermittently. However last night CP would not go away and was increasing so he decided to come to ER. He woke up with chest pain and diaphoresis and no other associated symptoms. per pt and son he has been getting episodes of chest pain on and off since last year for which he had workup done 10/2020. Stress test was done at [place] by Dr. Giovani Zuluaga and was negative. Pt was later on referred to pulmonologist who prescribed him Trelegy and singulair. Pt denies any cardiac history, family hx is negative as well. He never smoked and is active at home with no SOB.  Currently pt reports reproducible CP upon pressure located at 6th intercostal by mid-axillary rating 3/10, no N/V, radiation, palpitation, SOB, cough, fever diaphoresis.  No pain radiation, pain as he states is worse with pressure on the area. Pt reports eating well balanced meals with vegetables, no redmeat intake and no hx of HDL. He also is on daily omeprazole which he didnt miss.        Problem/Plan - 1:  ·  Problem: Chest pain Plan:   - Trop neg so far   -EKG noted NSR   -hold off NSAIDS for now   -- give metoprolol 50mg po 1 hr prior to CT of coronaries for goal HR in the 60s for CT   -- TTE to assess LVEF and for WMA  - CT coronaries to define anatomy  - pt still has reproducible chest pain 3/10 upon pressure and sometimes with inspiration  -monitor for symptoms and tele    Problem/Plan - 2:  ·  Problem: Preventive measure.  Plan: Dick FREDERICK-ELIZABETH Esquivel DO [facility]  Cardiovascular Medicine  [address] at [address], [address]  Office: [phone]  Cell: [phone]

## 2021-07-25 NOTE — PROGRESS NOTE ADULT - SUBJECTIVE AND OBJECTIVE BOX
INTERNAL MEDICINE PROGRESS NOTE    Dr. Tremayne Roblero DO  Attending Physician  Division of Hospital Medicine  [facility]  Available via Microsoft Teams (preferred)  Also available via Pager [phone]    SUBJECTIVE:  CP with chest wall palpation.  Not with exertion.     REVIEW OF SYSTEMS   12 point review of systems negative except for above.     PAST MEDICAL & SURGICAL HISTORY:  Allergic rhinitis, seasonal        MEDICATIONS  (STANDING):  budesonide  80 MICROgram(s)/formoterol 4.5 MICROgram(s) Inhaler 2 Puff(s) Inhalation two times a day  enoxaparin Injectable 40 milliGRAM(s) SubCutaneous at bedtime  metoprolol succinate ER 50 milliGRAM(s) Oral once  montelukast 10 milliGRAM(s) Oral daily  pantoprazole    Tablet 40 milliGRAM(s) Oral before breakfast  tiotropium 18 MICROgram(s) Capsule 1 Capsule(s) Inhalation daily    MEDICATIONS  (PRN):  acetaminophen   Tablet .. 650 milliGRAM(s) Oral every 6 hours PRN Temp greater or equal to 38.5C (101.3F), Mild Pain (1 - 3)  aluminum hydroxide/magnesium hydroxide/simethicone Suspension 30 milliLiter(s) Oral every 4 hours PRN Dyspepsia  melatonin 3 milliGRAM(s) Oral at bedtime PRN Insomnia  ondansetron Injectable 4 milliGRAM(s) IV Push every 8 hours PRN Nausea and/or Vomiting      Allergies    No Known Allergies    Intolerances        T(C): 36.7 (07-25-21 @ 04:47), Max: 36.8 (07-24-21 @ 11:53)  T(F): 98 (07-25-21 @ 04:47), Max: 98.2 (07-24-21 @ 11:53)  HR: 68 (07-25-21 @ 04:47) (68 - 97)  BP: 132/85 (07-25-21 @ 04:47) (132/85 - 136/81)  ABP: --  ABP(mean): --  RR: 18 (07-25-21 @ 04:47) (18 - 18)  SpO2: 98% (07-25-21 @ 04:47) (97% - 99%)      CONSTITUTIONAL: No acute distress.   HEENT:  Conjunctiva clear B/L.  Moist oral mucosa.   Cardiovascular: RRR with no murmurs. No JVD noted. No lower extremity edema B/L. Extremities are warm and well perfused. Radial pulses 2+ B/L. Dorsalis pedis pulses 2+ B/L.    Respiratory: Lungs CTAB. No wrr. No accessory muscle use.   Gastrointestinal:  Soft, nontender. Non-distended. Non-rigid. No CVA tenderness B/L.  MSK: chest wall pain with palpation.  Neurologic:  Alert and awake. Moving all extremities. Following commands. Making eye contact.    Skin:  No rashes noted. No skin erythema noted.   Psych:  Normal affect. Normal Mood.     LABS                        14.0   6.41  )-----------( 248      ( 25 Jul 2021 06:11 )             45.4     07-25    138  |  101  |  9   ----------------------------<  107<H>  4.6   |  26  |  0.91    Ca    9.8      25 Jul 2021 06:11  Mg     2.1     07-24    TPro  7.1  /  Alb  4.3  /  TBili  0.4  /  DBili  x   /  AST  14  /  ALT  13  /  AlkPhos  67  07-25          ALL RECENT STUDIES REVIEWED INCLUDING REPORTS AVAILABLE     CARE DISCUSSED WITH ALL CONSULTANTS

## 2021-07-25 NOTE — PROGRESS NOTE ADULT - PROBLEM SELECTOR PLAN 1
atypical in nature -- relatively low suspicion for acs, pe, ptx, ad, pna, pericarditis.   serial trop negative.  ekg reviewed.  tte pending.   ct coronaries to evaluate anatomy and any potential LVO.   cp reproducible which suggest costochondritis.   cards on the case -- assistance appreciated.

## 2021-07-26 DIAGNOSIS — K21.9 GASTRO-ESOPHAGEAL REFLUX DISEASE WITHOUT ESOPHAGITIS: ICD-10-CM

## 2021-07-26 LAB
ANION GAP SERPL CALC-SCNC: 12 MMOL/L — SIGNIFICANT CHANGE UP (ref 5–17)
BUN SERPL-MCNC: 12 MG/DL — SIGNIFICANT CHANGE UP (ref 7–23)
CALCIUM SERPL-MCNC: 9.3 MG/DL — SIGNIFICANT CHANGE UP (ref 8.4–10.5)
CHLORIDE SERPL-SCNC: 101 MMOL/L — SIGNIFICANT CHANGE UP (ref 96–108)
CO2 SERPL-SCNC: 26 MMOL/L — SIGNIFICANT CHANGE UP (ref 22–31)
CREAT SERPL-MCNC: 0.93 MG/DL — SIGNIFICANT CHANGE UP (ref 0.5–1.3)
GLUCOSE SERPL-MCNC: 119 MG/DL — HIGH (ref 70–99)
POTASSIUM SERPL-MCNC: 4.8 MMOL/L — SIGNIFICANT CHANGE UP (ref 3.5–5.3)
POTASSIUM SERPL-SCNC: 4.8 MMOL/L — SIGNIFICANT CHANGE UP (ref 3.5–5.3)
SODIUM SERPL-SCNC: 139 MMOL/L — SIGNIFICANT CHANGE UP (ref 135–145)

## 2021-07-26 PROCEDURE — 75574 CT ANGIO HRT W/3D IMAGE: CPT | Mod: 26

## 2021-07-26 PROCEDURE — 99232 SBSQ HOSP IP/OBS MODERATE 35: CPT

## 2021-07-26 PROCEDURE — 93306 TTE W/DOPPLER COMPLETE: CPT | Mod: 26

## 2021-07-26 RX ORDER — LIDOCAINE 4 G/100G
1 CREAM TOPICAL EVERY 24 HOURS
Refills: 0 | Status: DISCONTINUED | OUTPATIENT
Start: 2021-07-26 | End: 2021-07-27

## 2021-07-26 RX ORDER — METOPROLOL TARTRATE 50 MG
50 TABLET ORAL ONCE
Refills: 0 | Status: COMPLETED | OUTPATIENT
Start: 2021-07-26 | End: 2021-07-26

## 2021-07-26 RX ORDER — ACETAMINOPHEN 500 MG
650 TABLET ORAL EVERY 8 HOURS
Refills: 0 | Status: DISCONTINUED | OUTPATIENT
Start: 2021-07-26 | End: 2021-07-27

## 2021-07-26 RX ORDER — LIDOCAINE 4 G/100G
1 CREAM TOPICAL EVERY 24 HOURS
Refills: 0 | Status: DISCONTINUED | OUTPATIENT
Start: 2021-07-26 | End: 2021-07-26

## 2021-07-26 RX ORDER — IBUPROFEN 200 MG
400 TABLET ORAL EVERY 6 HOURS
Refills: 0 | Status: DISCONTINUED | OUTPATIENT
Start: 2021-07-26 | End: 2021-07-27

## 2021-07-26 RX ADMIN — MONTELUKAST 10 MILLIGRAM(S): 4 TABLET, CHEWABLE ORAL at 13:38

## 2021-07-26 RX ADMIN — ENOXAPARIN SODIUM 40 MILLIGRAM(S): 100 INJECTION SUBCUTANEOUS at 21:27

## 2021-07-26 RX ADMIN — Medication 650 MILLIGRAM(S): at 06:43

## 2021-07-26 RX ADMIN — LIDOCAINE 1 PATCH: 4 CREAM TOPICAL at 18:12

## 2021-07-26 RX ADMIN — BUDESONIDE AND FORMOTEROL FUMARATE DIHYDRATE 2 PUFF(S): 160; 4.5 AEROSOL RESPIRATORY (INHALATION) at 08:38

## 2021-07-26 RX ADMIN — Medication 650 MILLIGRAM(S): at 21:27

## 2021-07-26 RX ADMIN — Medication 650 MILLIGRAM(S): at 05:57

## 2021-07-26 RX ADMIN — PANTOPRAZOLE SODIUM 40 MILLIGRAM(S): 20 TABLET, DELAYED RELEASE ORAL at 05:57

## 2021-07-26 RX ADMIN — BUDESONIDE AND FORMOTEROL FUMARATE DIHYDRATE 2 PUFF(S): 160; 4.5 AEROSOL RESPIRATORY (INHALATION) at 21:00

## 2021-07-26 RX ADMIN — Medication 650 MILLIGRAM(S): at 22:55

## 2021-07-26 RX ADMIN — TIOTROPIUM BROMIDE 1 CAPSULE(S): 18 CAPSULE ORAL; RESPIRATORY (INHALATION) at 13:39

## 2021-07-26 RX ADMIN — Medication 50 MILLIGRAM(S): at 10:50

## 2021-07-26 RX ADMIN — LIDOCAINE 1 PATCH: 4 CREAM TOPICAL at 19:58

## 2021-07-26 NOTE — PROGRESS NOTE ADULT - ATTENDING COMMENTS
Pt care and plan discussed and reviewed with NP. Plan as outlined above edited by me to reflect our discussion.  pain is likely MSK in origin

## 2021-07-26 NOTE — PROGRESS NOTE ADULT - SUBJECTIVE AND OBJECTIVE BOX
[facility] Division of Hospital Medicine  Christie Cardoso MD  Pager (M-F, 8A-5P): [phone]  Other Times:  [phone]      Patient is a 70y old  Male who presents with a chief complaint of chest pain on and off x 24 hours (25 Jul 2021 11:46)      SUBJECTIVE / OVERNIGHT EVENTS: no acute events overnight. still has intermittent left sided chest pain, still reproducible with palpation. no fever, chills, nor dyspnea.  planned for CT coronaries today.  ADDITIONAL REVIEW OF SYSTEMS:    MEDICATIONS  (STANDING):  budesonide  80 MICROgram(s)/formoterol 4.5 MICROgram(s) Inhaler 2 Puff(s) Inhalation two times a day  enoxaparin Injectable 40 milliGRAM(s) SubCutaneous at bedtime  metoprolol succinate ER 50 milliGRAM(s) Oral once  montelukast 10 milliGRAM(s) Oral daily  pantoprazole    Tablet 40 milliGRAM(s) Oral before breakfast  tiotropium 18 MICROgram(s) Capsule 1 Capsule(s) Inhalation daily    MEDICATIONS  (PRN):  acetaminophen   Tablet .. 650 milliGRAM(s) Oral every 6 hours PRN Temp greater or equal to 38.5C (101.3F), Mild Pain (1 - 3)  aluminum hydroxide/magnesium hydroxide/simethicone Suspension 30 milliLiter(s) Oral every 4 hours PRN Dyspepsia  melatonin 3 milliGRAM(s) Oral at bedtime PRN Insomnia  ondansetron Injectable 4 milliGRAM(s) IV Push every 8 hours PRN Nausea and/or Vomiting      CAPILLARY BLOOD GLUCOSE        I&O's Summary    25 Jul 2021 07:01  -  26 Jul 2021 07:00  --------------------------------------------------------  IN: 700 mL / OUT: 0 mL / NET: 700 mL        PHYSICAL EXAM:  Vital Signs Last 24 Hrs  T(C): 36.4 (26 Jul 2021 10:44), Max: 37.1 (25 Jul 2021 20:32)  T(F): 97.5 (26 Jul 2021 10:44), Max: 98.8 (25 Jul 2021 20:32)  HR: 75 (26 Jul 2021 10:44) (75 - 77)  BP: 119/80 (26 Jul 2021 10:44) (119/80 - 150/90)  BP(mean): --  RR: 18 (26 Jul 2021 10:44) (18 - 18)  SpO2: 96% (26 Jul 2021 10:44) (96% - 97%)    CONSTITUTIONAL: NAD, well-developed, well-groomed  EYES: PERRLA; conjunctiva and sclera clear  ENMT: Moist oral mucosa, no pharyngeal injection or exudates; normal dentition  NECK: Supple, no palpable masses; no thyromegaly  RESPIRATORY: Normal respiratory effort; lungs are clear to auscultation bilaterally  CARDIOVASCULAR: Regular rate and rhythm, normal S1 and S2, no murmur/rub/gallop; No lower extremity edema; Peripheral pulses are 2+ bilaterally, +tender to palpation of L anterolateral chest wall  ABDOMEN: Soft, Nondistended,  Nontender to palpation, normoactive bowel sounds  MUSCULOSKELETAL:  No clubbing or cyanosis of digits; no joint swelling or tenderness to palpation  PSYCH: A+O to person, place, and time; affect appropriate  NEUROLOGY: CN 2-12 are intact and symmetric; no gross sensory deficits   SKIN: No rashes; no palpable lesions    LABS:                        14.0   6.41  )-----------( 248      ( 25 Jul 2021 06:11 )             45.4     07-26    139  |  101  |  12  ----------------------------<  119<H>  4.8   |  26  |  0.93    Ca    9.3      26 Jul 2021 05:42    TPro  7.1  /  Alb  4.3  /  TBili  0.4  /  DBili  x   /  AST  14  /  ALT  13  /  AlkPhos  67  07-25        RADIOLOGY & ADDITIONAL TESTS:  Results Reviewed: Cr stable, electrolytes at goal  Imaging Personally Reviewed:  Electrocardiogram Personally Reviewed:    COORDINATION OF CARE:  Care Discussed with Consultants/Other Providers [Y]: medicine ANTELMO Henderson  Prior or Outpatient Records Reviewed [Y]: Cardiology progress note

## 2021-07-26 NOTE — PROGRESS NOTE ADULT - PROBLEM SELECTOR PLAN 1
atypical in nature -- relatively low suspicion for ACS, PE, PTX, PNA, pericarditis.   his chest pain is reproducible with palpation on exam suggestive of possible costochondritis.  - troponins negative  - EKG NSR with no ischemic changes  - Cardiology following, recs appreciated  - plan for CT Coronaries today  - f/u TTE to evaluate for WMA

## 2021-07-26 NOTE — PROGRESS NOTE ADULT - SUBJECTIVE AND OBJECTIVE BOX
Patient is a 70y old  Male who presents with a chief complaint of chest pain on and off x 24 hours (26 Jul 2021 12:28)      INTERVAL HISTORY: feels ok CTC today     TELEMETRY Personally reviewed: SR 60-90s     REVIEW OF SYSTEMS:   CONSTITUTIONAL: No weakness  EYES/ENT: No visual changes; No throat pain  Neck: No pain or stiffness  Respiratory: No cough, wheezing, No shortness of breath  CARDIOVASCULAR: no chest pain or palpitations  GASTROINTESTINAL: No abdominal pain, no nausea, vomiting or hematemesis  GENITOURINARY: No dysuria, frequency or hematuria  NEUROLOGICAL: No stroke like symptoms  SKIN: No rashes  	  MEDICATIONS:  metoprolol succinate ER 50 milliGRAM(s) Oral once    PHYSICAL EXAM:  T(C): 36.4 (07-26-21 @ 10:44), Max: 37.1 (07-25-21 @ 20:32)  HR: 75 (07-26-21 @ 10:44) (75 - 76)  BP: 119/80 (07-26-21 @ 10:44) (119/80 - 150/90)  RR: 18 (07-26-21 @ 10:44) (18 - 18)  SpO2: 96% (07-26-21 @ 10:44) (96% - 97%)  Wt(kg): --  I&O's Summary    25 Jul 2021 07:01  -  26 Jul 2021 07:00  --------------------------------------------------------  IN: 700 mL / OUT: 0 mL / NET: 700 mL    26 Jul 2021 07:01  -  26 Jul 2021 14:51  --------------------------------------------------------  IN: 0 mL / OUT: 0 mL / NET: 0 mL    Appearance: In no distress	  HEENT:    PERRL, EOMI	  Cardiovascular:  S1 S2, No JVD  Respiratory: Lungs clear to auscultation	  Gastrointestinal:  Soft, Non-tender, + BS	  Vascularature:  No edema of LE  Psychiatric: Appropriate affect   Neuro: no acute focal deficits                         14.0   6.41  )-----------( 248      ( 25 Jul 2021 06:11 )             45.4     07-26    139  |  101  |  12  ----------------------------<  119<H>  4.8   |  26  |  0.93    Ca    9.3      26 Jul 2021 05:42    TPro  7.1  /  Alb  4.3  /  TBili  0.4  /  DBili  x   /  AST  14  /  ALT  13  /  AlkPhos  67  07-25  Labs personally reviewed    ASSESSMENT/PLAN: 	    71 yo male with hx of GERD presents with c/o of CP started 2 days ago intermittently. However last night CP would not go away and was increasing so he decided to come to ER. He woke up with chest pain and diaphoresis and no other associated symptoms. per pt and son he has been getting episodes of chest pain on and off since last year for which he had workup done 10/2020. Stress test was done at [place] by Dr. Giovani Zuluaga and was negative. Pt was later on referred to pulmonologist who prescribed him Trelegy and singulair. Pt denies any cardiac history, family hx is negative as well. He never smoked and is active at home with no SOB.  Currently pt reports reproducible CP upon pressure located at 6th intercostal by mid-axillary rating 3/10, no N/V, radiation, palpitation, SOB, cough, fever diaphoresis.  No pain radiation, pain as he states is worse with pressure on the area. Pt reports eating well balanced meals with vegetables, no redmeat intake and no hx of HDL. He also is on daily omeprazole which he didnt miss.        Problem/Plan - 1:  ·  Problem: Chest pain Plan:   - Trop neg so far   -EKG noted NSR   -hold off NSAIDS for now   -- give metoprolol 50mg po 1 hr prior to CT of coronaries for goal HR in the 60s for CT   -- TTE to assess LVEF and for WMA  - CT coronaries to define anatomy  - pt still has reproducible chest pain 3/10 upon pressure and sometimes with inspiration    Problem/Plan - 2:  ·  Problem: Preventive measure.  Plan: Lovenox SQ         Katt Gordon ANPBERTHA Esquivel DO [facility]  Cardiovascular Medicine  [address] [place] [address], [address]  Office: [phone]  Cell: [phone]

## 2021-07-26 NOTE — PROGRESS NOTE ADULT - NSPROGADDITIONALINFOA_GEN_ALL_CORE
.  Christie Cardoso MD  Division of Hospital Medicine  [facility]   Pager: [phone]    Plan discussed with patient and medicine NP Beatriz. .  Christie Cardoso MD  Division of Hospital Medicine  [facility]   Pager: [phone]    Plan discussed with patient, africa Medeiros via phone, Cardiology attending Dr. Esquivel, and medicine NP Beatriz.

## 2021-07-27 VITALS
TEMPERATURE: 97 F | SYSTOLIC BLOOD PRESSURE: 128 MMHG | RESPIRATION RATE: 18 BRPM | OXYGEN SATURATION: 97 % | DIASTOLIC BLOOD PRESSURE: 78 MMHG | HEART RATE: 66 BPM

## 2021-07-27 LAB
ANION GAP SERPL CALC-SCNC: 9 MMOL/L — SIGNIFICANT CHANGE UP (ref 5–17)
BUN SERPL-MCNC: 11 MG/DL — SIGNIFICANT CHANGE UP (ref 7–23)
CALCIUM SERPL-MCNC: 9.4 MG/DL — SIGNIFICANT CHANGE UP (ref 8.4–10.5)
CHLORIDE SERPL-SCNC: 101 MMOL/L — SIGNIFICANT CHANGE UP (ref 96–108)
CO2 SERPL-SCNC: 26 MMOL/L — SIGNIFICANT CHANGE UP (ref 22–31)
CREAT SERPL-MCNC: 0.87 MG/DL — SIGNIFICANT CHANGE UP (ref 0.5–1.3)
GLUCOSE SERPL-MCNC: 108 MG/DL — HIGH (ref 70–99)
HCT VFR BLD CALC: 45.4 % — SIGNIFICANT CHANGE UP (ref 39–50)
HGB BLD-MCNC: 13.9 G/DL — SIGNIFICANT CHANGE UP (ref 13–17)
MCHC RBC-ENTMCNC: 24.8 PG — LOW (ref 27–34)
MCHC RBC-ENTMCNC: 30.6 GM/DL — LOW (ref 32–36)
MCV RBC AUTO: 80.9 FL — SIGNIFICANT CHANGE UP (ref 80–100)
NRBC # BLD: 0 /100 WBCS — SIGNIFICANT CHANGE UP (ref 0–0)
PLATELET # BLD AUTO: 235 K/UL — SIGNIFICANT CHANGE UP (ref 150–400)
POTASSIUM SERPL-MCNC: 4.5 MMOL/L — SIGNIFICANT CHANGE UP (ref 3.5–5.3)
POTASSIUM SERPL-SCNC: 4.5 MMOL/L — SIGNIFICANT CHANGE UP (ref 3.5–5.3)
RBC # BLD: 5.61 M/UL — SIGNIFICANT CHANGE UP (ref 4.2–5.8)
RBC # FLD: 15 % — HIGH (ref 10.3–14.5)
SODIUM SERPL-SCNC: 136 MMOL/L — SIGNIFICANT CHANGE UP (ref 135–145)
WBC # BLD: 6.76 K/UL — SIGNIFICANT CHANGE UP (ref 3.8–10.5)
WBC # FLD AUTO: 6.76 K/UL — SIGNIFICANT CHANGE UP (ref 3.8–10.5)

## 2021-07-27 PROCEDURE — 93306 TTE W/DOPPLER COMPLETE: CPT

## 2021-07-27 PROCEDURE — 80048 BASIC METABOLIC PNL TOTAL CA: CPT

## 2021-07-27 PROCEDURE — 93005 ELECTROCARDIOGRAM TRACING: CPT

## 2021-07-27 PROCEDURE — 84484 ASSAY OF TROPONIN QUANT: CPT

## 2021-07-27 PROCEDURE — 83735 ASSAY OF MAGNESIUM: CPT

## 2021-07-27 PROCEDURE — 86803 HEPATITIS C AB TEST: CPT

## 2021-07-27 PROCEDURE — 99285 EMERGENCY DEPT VISIT HI MDM: CPT

## 2021-07-27 PROCEDURE — 85027 COMPLETE CBC AUTOMATED: CPT

## 2021-07-27 PROCEDURE — 82553 CREATINE MB FRACTION: CPT

## 2021-07-27 PROCEDURE — 80053 COMPREHEN METABOLIC PANEL: CPT

## 2021-07-27 PROCEDURE — 71045 X-RAY EXAM CHEST 1 VIEW: CPT

## 2021-07-27 PROCEDURE — 83880 ASSAY OF NATRIURETIC PEPTIDE: CPT

## 2021-07-27 PROCEDURE — 99239 HOSP IP/OBS DSCHRG MGMT >30: CPT

## 2021-07-27 PROCEDURE — 87635 SARS-COV-2 COVID-19 AMP PRB: CPT

## 2021-07-27 PROCEDURE — 85025 COMPLETE CBC W/AUTO DIFF WBC: CPT

## 2021-07-27 PROCEDURE — 82550 ASSAY OF CK (CPK): CPT

## 2021-07-27 PROCEDURE — 75574 CT ANGIO HRT W/3D IMAGE: CPT

## 2021-07-27 PROCEDURE — 86769 SARS-COV-2 COVID-19 ANTIBODY: CPT

## 2021-07-27 PROCEDURE — 80061 LIPID PANEL: CPT

## 2021-07-27 PROCEDURE — 94640 AIRWAY INHALATION TREATMENT: CPT

## 2021-07-27 RX ORDER — ACETAMINOPHEN 500 MG
2 TABLET ORAL
Qty: 0 | Refills: 0 | DISCHARGE
Start: 2021-07-27

## 2021-07-27 RX ADMIN — Medication 650 MILLIGRAM(S): at 06:00

## 2021-07-27 RX ADMIN — MONTELUKAST 10 MILLIGRAM(S): 4 TABLET, CHEWABLE ORAL at 11:41

## 2021-07-27 RX ADMIN — BUDESONIDE AND FORMOTEROL FUMARATE DIHYDRATE 2 PUFF(S): 160; 4.5 AEROSOL RESPIRATORY (INHALATION) at 08:48

## 2021-07-27 RX ADMIN — PANTOPRAZOLE SODIUM 40 MILLIGRAM(S): 20 TABLET, DELAYED RELEASE ORAL at 06:00

## 2021-07-27 RX ADMIN — TIOTROPIUM BROMIDE 1 CAPSULE(S): 18 CAPSULE ORAL; RESPIRATORY (INHALATION) at 11:41

## 2021-07-27 RX ADMIN — LIDOCAINE 1 PATCH: 4 CREAM TOPICAL at 06:00

## 2021-07-27 RX ADMIN — Medication 650 MILLIGRAM(S): at 06:25

## 2021-07-27 NOTE — DISCHARGE NOTE NURSING/CASE MANAGEMENT/SOCIAL WORK - PATIENT PORTAL LINK FT
You can access the FollowMyHealth Patient Portal offered by [facility] by registering at the following website: [email or web address]. By joining [employer]’s FollowMyHealth portal, you will also be able to view your health information using other applications (apps) compatible with our system.

## 2021-07-27 NOTE — DISCHARGE NOTE PROVIDER - HOSPITAL COURSE
71 yo M with PMH of GERD who presented with 2 days of intermittent chest pain and diaphoresis     Admitted with Chest pain - Troponin negative x 2 , EKG NSR with no ischemic changes, CT angio with nonobstructive CAD  TTE to evaluate for WMA.   Chest pain is atypical in nature - reproducible with palpation on exam suggestive of possible costochondritis.   69 yo M with PMH of GERD who presented with 2 days of intermittent chest pain and diaphoresis   Admitted with Chest pain - Troponin negative x 2 , EKG NSR with no ischemic changes, CT angio with nonobstructive CAD  Echo is unremarkable with no wall motion abnormality EF 59%  Chest pain is atypical in nature - reproducible with palpation on exam suggestive of possible costochondritis.  Medically cleared for discharge with follow up with cardiologist - Dr. Mckeon 71 yo M with PMH of GERD who presented with 2 days of intermittent chest pain and diaphoresis   Admitted with Chest pain - Troponin negative x 2 , EKG NSR with no ischemic changes, CT angio with nonobstructive CAD  Echo is unremarkable with no wall motion abnormality EF 59%  Chest pain is atypical in nature - reproducible with palpation on exam suggestive of possible costochondritis/musculoskeletal etiology. improves with tylenol which he should contine as needed.  Medically cleared for discharge with follow up with cardiologist - Dr. Mckeon

## 2021-07-27 NOTE — PROGRESS NOTE ADULT - REASON FOR ADMISSION
chest pain on and off x 24 hours

## 2021-07-27 NOTE — DISCHARGE NOTE PROVIDER - NSDCMRMEDTOKEN_GEN_ALL_CORE_FT
acetaminophen 325 mg oral tablet: 2 tab(s) orally every 8 hours  omeprazole 20 mg oral delayed release capsule: 1 cap(s) orally once a day  Singulair 10 mg oral tablet: 1 tab(s) orally once a day  Trelegy Ellipta 100 mcg-62.5 mcg-25 mcg/inh inhalation powder: 1 puff(s) inhaled once a day

## 2021-07-27 NOTE — PROGRESS NOTE ADULT - ASSESSMENT
71 yo M with PMH of GERD who presented with 2 days of intermittent chest pain and diaphoresis now improved with normal CT Coronaries. Overall pain improved and reproducible on exam, likely musculoskeletal in etiology.
71 yo M with no sig pmhx presents with 2ds of intermittent chest pain, woke up with chest pain and diaphoresis. 
69 yo M with PMH of GERD who presented with 2 days of intermittent chest pain and diaphoresis pending CT coronaries and TTE.

## 2021-07-27 NOTE — PROGRESS NOTE ADULT - SUBJECTIVE AND OBJECTIVE BOX
Patient is a 70y old  Male who presents with a chief complaint of chest pain on and off x 24 hours (27 Jul 2021 13:04)      INTERVAL HISTORY: feels ok     REVIEW OF SYSTEMS:   CONSTITUTIONAL: No weakness  EYES/ENT: No visual changes; No throat pain  Neck: No pain or stiffness  Respiratory: No cough, wheezing, No shortness of breath  CARDIOVASCULAR: no chest pain or palpitations  GASTROINTESTINAL: No abdominal pain, no nausea, vomiting or hematemesis  GENITOURINARY: No dysuria, frequency or hematuria  NEUROLOGICAL: No stroke like symptoms  SKIN: No rashes    	  MEDICATIONS:  metoprolol succinate ER 50 milliGRAM(s) Oral once        PHYSICAL EXAM:  T(C): 36.1 (07-27-21 @ 10:54), Max: 36.8 (07-27-21 @ 05:10)  HR: 66 (07-27-21 @ 10:54) (60 - 66)  BP: 128/78 (07-27-21 @ 10:54) (128/78 - 138/88)  RR: 18 (07-27-21 @ 10:54) (18 - 18)  SpO2: 97% (07-27-21 @ 10:54) (96% - 97%)  Wt(kg): --  I&O's Summary    26 Jul 2021 07:01  -  27 Jul 2021 07:00  --------------------------------------------------------  IN: 0 mL / OUT: 0 mL / NET: 0 mL    27 Jul 2021 07:01  -  27 Jul 2021 13:45  --------------------------------------------------------  IN: 120 mL / OUT: 0 mL / NET: 120 mL    Appearance: In no distress	  HEENT:    PERRL, EOMI	  Cardiovascular:  S1 S2, No JVD  Respiratory: Lungs clear to auscultation	  Gastrointestinal:  Soft, Non-tender, + BS	  Vascularature:  No edema of LE  Psychiatric: Appropriate affect   Neuro: no acute focal deficits                           13.9   6.76  )-----------( 235      ( 27 Jul 2021 06:05 )             45.4     07-27    136  |  101  |  11  ----------------------------<  108<H>  4.5   |  26  |  0.87    Ca    9.4      27 Jul 2021 06:0    Labs personally reviewed    < from: Transthoracic Echocardiogram (07.26.21 @ 12:17) >  1. Mitral annular calcification, otherwise normal mitral  valve.  2. Mildly calcified trileaflet aortic valve with normal  opening.  3. Normal left ventricular internal dimensions and wall  thicknesses.  4. Normal left ventricular systolic function. No segmental  wall motion abnormalities.  There is a false tendon in the  LV cavity (normal variant).  5. Normal diastolic function  6. The right ventricle is not well visualized; grossly  normal right ventricular systolic function.  7. Estimated pulmonary artery systolic pressure equals 24  mm Hg, assuming right atrial pressure equals 8  mm Hg,  consistent with normal pulmonary pressures.  *** No previous Echo exam.    < end of copied text >    ASSESSMENT/PLAN: 	  69 yo male with hx of GERD presents with c/o of CP started 2 days ago intermittently. However last night CP would not go away and was increasing so he decided to come to ER. He woke up with chest pain and diaphoresis and no other associated symptoms. per pt and son he has been getting episodes of chest pain on and off since last year for which he had workup done 10/2020. Stress test was done at [facility] by Dr. Giovani Zuluaga and was negative. Pt was later on referred to pulmonologist who prescribed him Trelegy and singulair. Pt denies any cardiac history, family hx is negative as well. He never smoked and is active at home with no SOB.  Currently pt reports reproducible CP upon pressure located at 6th intercostal by mid-axillary rating 3/10, no N/V, radiation, palpitation, SOB, cough, fever diaphoresis.  No pain radiation, pain as he states is worse with pressure on the area. Pt reports eating well balanced meals with vegetables, no redmeat intake and no hx of HDL. He also is on daily omeprazole which he didnt miss.        Problem/Plan - 1:  ·  Problem: Chest pain Plan:   - Trop neg so far   -EKG noted NSR   -hold off NSAIDS for now   - pt still has reproducible chest pain 3/10 upon pressure and sometimes with inspiration  - CTC <30% calcified plaque in LAD, <30% RCA-- no intervention   - TTE- no WMA, nml LV function   pain reproducible to palpation- likely MSK     Problem/Plan - 2:  ·  Problem: Preventive measure.  Plan: Dick Esquivel DO [facility]  Cardiovascular Medicine  [address] [place] [address], [address]  Office: [phone]  Cell: [phone]

## 2021-07-27 NOTE — PROGRESS NOTE ADULT - PROBLEM SELECTOR PLAN 4
Pt states that he is taking Trelegy for some pulm issues and he is being followed by a pulm specialist   f/up as outpatient.
Pt states that he is taking Trelegy for some pulm issues and he is being followed by a pulm specialist   f/up as outpatient. he has appointment next week per son.
Pt states that he is taking Trelegy for some pulm issues and he is being followed by a pulm specialist   f/up as outpatient.

## 2021-07-27 NOTE — DISCHARGE NOTE PROVIDER - CARE PROVIDER_API CALL
Giovani Zuluaga)  Cardiovascular Disease; Internal Medicine  [address] [place] [address], [id number]  [place] [address]  Phone: [phone]  Fax: [phone]  Follow Up Time: 1 week

## 2021-07-27 NOTE — PROGRESS NOTE ADULT - NSPROGADDITIONALINFOA_GEN_ALL_CORE
.  Christie Cardoso MD  Division of Hospital Medicine  [facility]   Pager: [phone]    Medically stable for discharge home today 7/27/21.  Pending TTE results as patient would like results prior to discharge.  Discharge planning time spent: 32 minutes.    Plan discussed with patient, Cardiology attending Dr. Esquivel, and medicine NP Alivia.  Updated son Waldemar via phone yesterday. Will call him today when TTE results return. .  Christie Cardoso MD  Division of Hospital Medicine  [facility]   Pager: [phone]    Medically stable for discharge home today 7/27/21.  Discharge planning time spent: 32 minutes.    Plan discussed with patient, africa Medeiros via phone, Cardiology attending Dr. Esquivel, and medicine NP Alivia.

## 2021-07-27 NOTE — PROGRESS NOTE ADULT - PROBLEM SELECTOR PROBLEM 3
Seasonal allergic rhinitis, unspecified trigger

## 2021-07-27 NOTE — DISCHARGE NOTE PROVIDER - NSDCCPCAREPLAN_GEN_ALL_CORE_FT
PRINCIPAL DISCHARGE DIAGNOSIS  Diagnosis: Atypical chest pain  Assessment and Plan of Treatment: Admitted with Chest pain - Troponin negative x 2    EKG NSR with no ischemic changes   CT angio with minor plaques in the arteries of the heart  Your pain is likley due to musculoskeletal  Take tylenol for pain  SEEK IMMEDIATE MEDICAL CARE IF:  You have chest pain or pain that spreads to your arm, neck, jaw, back, or abdomen.   You develop shortness of breath, an increasing cough.  You have severe back or abdominal pain, feel nauseous, or vomit.  You develop severe weakness, fainting, or chills.  THIS IS AN EMERGENCY. Do not wait to see if the pain will go away. Get medical help at once. Call your local emergency services (__________[phone]___________). Do not drive yourself to the hospital.        SECONDARY DISCHARGE DIAGNOSES  Diagnosis: CAD (coronary artery disease)  Assessment and Plan of Treatment: Coronary artery disease is a condition where the arteries the supply the heart muscle get clogges with fatty deposits & puts you at risk for a heart attack  Call your doctor if you have any new pain, pressure, or discomfort in the center of your chest, pain, tingling or discomfort in arms, back, neck, jaw, or stomach, shortness of breath, nausea, vomiting, burping or heartburn, sweating, cold and clammy skin, racing or abnormal heartbeat for more than 10 minutes or if they keep coming & going.  Call 911 and do not tr to get to hospital by care  You can help yourself with lefestyle changes (quitting smoking if you smoke), eat lots of fruits & vegetables & low fat dairy products, not a lot of meat & fatty foods, walk or some form of physical activity most days of the week, lose weight if you are overweight  Take your cardiac medication as prescribed to lower cholesterol, to lower blood pressure, aspirin to prevent blood clots, and diabetes control  Make sure to keep appointments with doctor for cardiac follow up care      Diagnosis: GERD (gastroesophageal reflux disease)  Assessment and Plan of Treatment: Conitnue omeprazole    Diagnosis: Seasonal allergic rhinitis, unspecified trigger  Assessment and Plan of Treatment: Continue with Singulair    Diagnosis: Abnormality of lung on chest x-ray  Assessment and Plan of Treatment: Abnormal lung changes on outpatient chest xray  Continue nebulaizers as previously recommended  Follow up with Lung doctor  Seek medical help for difficulty breathing

## 2021-07-27 NOTE — PROGRESS NOTE ADULT - SUBJECTIVE AND OBJECTIVE BOX
[facility] Division of Hospital Medicine  Christie Cardoso MD  Pager (TOYA-F, 8A-5P): [phone]  Other Times:  [phone]      Patient is a 70y old  Male who presents with a chief complaint of chest pain on and off x 24 hours (27 Jul 2021 10:36)      SUBJECTIVE / OVERNIGHT EVENTS: no acute events overnight CT coronaries normal yesterday. TTE performed though results still pending. states the pain improved today (lidocaine patch didn't make much of difference). states the tylenol works best so encouraged him to use more as needed since he had only been asking for it once a day.  ADDITIONAL REVIEW OF SYSTEMS:    MEDICATIONS  (STANDING):  acetaminophen   Tablet .. 650 milliGRAM(s) Oral every 8 hours  budesonide  80 MICROgram(s)/formoterol 4.5 MICROgram(s) Inhaler 2 Puff(s) Inhalation two times a day  enoxaparin Injectable 40 milliGRAM(s) SubCutaneous at bedtime  lidocaine   5% Patch 1 Patch Transdermal every 24 hours  metoprolol succinate ER 50 milliGRAM(s) Oral once  montelukast 10 milliGRAM(s) Oral daily  pantoprazole    Tablet 40 milliGRAM(s) Oral before breakfast  tiotropium 18 MICROgram(s) Capsule 1 Capsule(s) Inhalation daily    MEDICATIONS  (PRN):  aluminum hydroxide/magnesium hydroxide/simethicone Suspension 30 milliLiter(s) Oral every 4 hours PRN Dyspepsia  ibuprofen  Tablet. 400 milliGRAM(s) Oral every 6 hours PRN Moderate Pain (4 - 6)  melatonin 3 milliGRAM(s) Oral at bedtime PRN Insomnia  ondansetron Injectable 4 milliGRAM(s) IV Push every 8 hours PRN Nausea and/or Vomiting      CAPILLARY BLOOD GLUCOSE        I&O's Summary    26 Jul 2021 07:01  -  27 Jul 2021 07:00  --------------------------------------------------------  IN: 0 mL / OUT: 0 mL / NET: 0 mL    27 Jul 2021 07:01  -  27 Jul 2021 13:04  --------------------------------------------------------  IN: 120 mL / OUT: 0 mL / NET: 120 mL        PHYSICAL EXAM:  Vital Signs Last 24 Hrs  T(C): 36.1 (27 Jul 2021 10:54), Max: 36.8 (27 Jul 2021 05:10)  T(F): 97 (27 Jul 2021 10:54), Max: 98.2 (27 Jul 2021 05:10)  HR: 66 (27 Jul 2021 10:54) (60 - 66)  BP: 128/78 (27 Jul 2021 10:54) (128/78 - 138/88)  BP(mean): --  RR: 18 (27 Jul 2021 10:54) (18 - 18)  SpO2: 97% (27 Jul 2021 10:54) (96% - 97%)      CONSTITUTIONAL: NAD, well-developed, well-groomed  EYES: PERRLA; conjunctiva and sclera clear  ENMT: Moist oral mucosa, no pharyngeal injection or exudates; normal dentition  NECK: Supple, no palpable masses; no thyromegaly  RESPIRATORY: Normal respiratory effort; lungs are clear to auscultation bilaterally  CARDIOVASCULAR: Regular rate and rhythm, normal S1 and S2, no murmur/rub/gallop; No lower extremity edema; Peripheral pulses are 2+ bilaterally, + point tender to palpation of L anterolateral chest wall, reproducible - improved from day prior  ABDOMEN: Soft, Nondistended,  Nontender to palpation, normoactive bowel sounds  MUSCULOSKELETAL:  No clubbing or cyanosis of digits; no joint swelling or tenderness to palpation  PSYCH: A+O to person, place, and time; affect appropriate  NEUROLOGY: CN 2-12 are intact and symmetric; no gross sensory deficits   SKIN: No rashes; no palpable lesions    LABS:                        13.9   6.76  )-----------( 235      ( 27 Jul 2021 06:05 )             45.4     07-27    136  |  101  |  11  ----------------------------<  108<H>  4.5   |  26  |  0.87    Ca    9.4      27 Jul 2021 06:05      RADIOLOGY & ADDITIONAL TESTS:  Results Reviewed: no leukocytosis, H/H stable, Cr within normal limits  Imaging Personally Reviewed:  7/26/21 CTA Coronaries:  FINDINGS:    Cardiovascular:    Coronary arteries:  Coronary artery calcium Agatston score:  Left main (LM) coronary artery:  0  Left anterior descending (LAD) coronary artery:  56  Left circumflex (LCX) coronary artery:  0  Right coronary artery (RCA):  0  Total:  56    Right-dominant coronary circulation.    The LM coronary artery has minimal non-calcified plaque.    The LAD coronary artery has minimal (<30%) non-calcified and calcified plaque in the proximal segment and mild (30-49%) non-calcified and calcified plaque in the mid segment.  A very small-caliber first diagonal branch is present.  The second diagonal branch has minimal (<30%) non-calcified and calcified plaque.    The LCX coronary artery has minimal (30%) non-calcified plaque in the distal segment.  The two obtuse marginal (OM) branches are angiographically normal.    The RCA has minimal (<30%) non-calcified plaque in the proximal and mid segments.  The right posterior descending artery (PDA) and the right posterolateral (RPL) branch appear angiographically normal.    Aorta:  No thoracic aortic dissection noted in the visualized thoracic aorta.    Minimal non-calcified and calcified plaque is present in the visualized thoracic aorta.    Pericardium:  There is no pericardial effusion.    Chambers:  The cardiac chambers are qualitatively normal in size.    No filling defect noted in the left atrial appendage.    Aortic valve:  Minimal aortic valve calcification noted.    Non-cardiac:  No hilar or mediastinal adenopathy is noted. Minimal atelectasis is noted involving portions of both lower lobes. Degenerative changes of the spine are noted.    IMPRESSION:  1.  Non-obstructive epicardial coronary artery disease as reported above.  2.  Mild coronary artery calcium (Agatston score 56) as delineated above.  Electrocardiogram Personally Reviewed:    COORDINATION OF CARE:  Care Discussed with Consultants/Other Providers [Y]: Cardiology attending Dr. Esquivel, medicine ANTELMO Bunch  Prior or Outpatient Records Reviewed [Y/N]:   [facility] Division of Hospital Medicine  Christie Cardoso MD  Pager (TOYA-F, 8A-5P): [phone]  Other Times:  [phone]      Patient is a 70y old  Male who presents with a chief complaint of chest pain on and off x 24 hours (27 Jul 2021 10:36)      SUBJECTIVE / OVERNIGHT EVENTS: no acute events overnight CT coronaries normal yesterday. TTE performed though results still pending. states the pain improved today (lidocaine patch didn't make much of difference). states the tylenol works best so encouraged him to use more as needed since he had only been asking for it once a day.  ADDITIONAL REVIEW OF SYSTEMS:    MEDICATIONS  (STANDING):  acetaminophen   Tablet .. 650 milliGRAM(s) Oral every 8 hours  budesonide  80 MICROgram(s)/formoterol 4.5 MICROgram(s) Inhaler 2 Puff(s) Inhalation two times a day  enoxaparin Injectable 40 milliGRAM(s) SubCutaneous at bedtime  lidocaine   5% Patch 1 Patch Transdermal every 24 hours  metoprolol succinate ER 50 milliGRAM(s) Oral once  montelukast 10 milliGRAM(s) Oral daily  pantoprazole    Tablet 40 milliGRAM(s) Oral before breakfast  tiotropium 18 MICROgram(s) Capsule 1 Capsule(s) Inhalation daily    MEDICATIONS  (PRN):  aluminum hydroxide/magnesium hydroxide/simethicone Suspension 30 milliLiter(s) Oral every 4 hours PRN Dyspepsia  ibuprofen  Tablet. 400 milliGRAM(s) Oral every 6 hours PRN Moderate Pain (4 - 6)  melatonin 3 milliGRAM(s) Oral at bedtime PRN Insomnia  ondansetron Injectable 4 milliGRAM(s) IV Push every 8 hours PRN Nausea and/or Vomiting      CAPILLARY BLOOD GLUCOSE        I&O's Summary    26 Jul 2021 07:01  -  27 Jul 2021 07:00  --------------------------------------------------------  IN: 0 mL / OUT: 0 mL / NET: 0 mL    27 Jul 2021 07:01  -  27 Jul 2021 13:04  --------------------------------------------------------  IN: 120 mL / OUT: 0 mL / NET: 120 mL        PHYSICAL EXAM:  Vital Signs Last 24 Hrs  T(C): 36.1 (27 Jul 2021 10:54), Max: 36.8 (27 Jul 2021 05:10)  T(F): 97 (27 Jul 2021 10:54), Max: 98.2 (27 Jul 2021 05:10)  HR: 66 (27 Jul 2021 10:54) (60 - 66)  BP: 128/78 (27 Jul 2021 10:54) (128/78 - 138/88)  BP(mean): --  RR: 18 (27 Jul 2021 10:54) (18 - 18)  SpO2: 97% (27 Jul 2021 10:54) (96% - 97%)      CONSTITUTIONAL: NAD, well-developed, well-groomed  EYES: PERRLA; conjunctiva and sclera clear  ENMT: Moist oral mucosa, no pharyngeal injection or exudates; normal dentition  NECK: Supple, no palpable masses; no thyromegaly  RESPIRATORY: Normal respiratory effort; lungs are clear to auscultation bilaterally  CARDIOVASCULAR: Regular rate and rhythm, normal S1 and S2, no murmur/rub/gallop; No lower extremity edema; Peripheral pulses are 2+ bilaterally, + point tender to palpation of L anterolateral chest wall, reproducible - improved from day prior  ABDOMEN: Soft, Nondistended,  Nontender to palpation, normoactive bowel sounds  MUSCULOSKELETAL:  No clubbing or cyanosis of digits; no joint swelling or tenderness to palpation  PSYCH: A+O to person, place, and time; affect appropriate  NEUROLOGY: CN 2-12 are intact and symmetric; no gross sensory deficits   SKIN: No rashes; no palpable lesions    LABS:                        13.9   6.76  )-----------( 235      ( 27 Jul 2021 06:05 )             45.4     07-27    136  |  101  |  11  ----------------------------<  108<H>  4.5   |  26  |  0.87    Ca    9.4      27 Jul 2021 06:05      RADIOLOGY & ADDITIONAL TESTS:  Results Reviewed: no leukocytosis, H/H stable, Cr within normal limits  Imaging Personally Reviewed:  7/26/21 TTE:  Conclusions:  1. Mitral annular calcification, otherwise normal mitral  valve.  2. Mildly calcified trileaflet aortic valve with normal  opening.  3. Normal left ventricular internal dimensions and wall  thicknesses.  4. Normal left ventricular systolic function. No segmental  wall motion abnormalities.  There is a false tendon in the  LV cavity (normal variant).  5. Normal diastolic function  6. The right ventricle is not well visualized; grossly  normal right ventricular systolic function.  7. Estimated pulmonary artery systolic pressure equals 24  mm Hg, assuming right atrial pressure equals 8  mm Hg,  consistent with normal pulmonary pressures.    7/26/21 CTA Coronaries:  FINDINGS:    Cardiovascular:    Coronary arteries:  Coronary artery calcium Agatston score:  Left main (LM) coronary artery:  0  Left anterior descending (LAD) coronary artery:  56  Left circumflex (LCX) coronary artery:  0  Right coronary artery (RCA):  0  Total:  56    Right-dominant coronary circulation.    The LM coronary artery has minimal non-calcified plaque.    The LAD coronary artery has minimal (<30%) non-calcified and calcified plaque in the proximal segment and mild (30-49%) non-calcified and calcified plaque in the mid segment.  A very small-caliber first diagonal branch is present.  The second diagonal branch has minimal (<30%) non-calcified and calcified plaque.    The LCX coronary artery has minimal (30%) non-calcified plaque in the distal segment.  The two obtuse marginal (OM) branches are angiographically normal.    The RCA has minimal (<30%) non-calcified plaque in the proximal and mid segments.  The right posterior descending artery (PDA) and the right posterolateral (RPL) branch appear angiographically normal.    Aorta:  No thoracic aortic dissection noted in the visualized thoracic aorta.    Minimal non-calcified and calcified plaque is present in the visualized thoracic aorta.    Pericardium:  There is no pericardial effusion.    Chambers:  The cardiac chambers are qualitatively normal in size.    No filling defect noted in the left atrial appendage.    Aortic valve:  Minimal aortic valve calcification noted.    Non-cardiac:  No hilar or mediastinal adenopathy is noted. Minimal atelectasis is noted involving portions of both lower lobes. Degenerative changes of the spine are noted.    IMPRESSION:  1.  Non-obstructive epicardial coronary artery disease as reported above.  2.  Mild coronary artery calcium (Agatston score 56) as delineated above.  Electrocardiogram Personally Reviewed:    COORDINATION OF CARE:  Care Discussed with Consultants/Other Providers [Y]: Cardiology attending Dr. Esquivel, medicine NP Alivia  Prior or Outpatient Records Reviewed [Y/N]:

## 2021-07-27 NOTE — PROGRESS NOTE ADULT - PROBLEM SELECTOR PLAN 1
atypical in nature -- relatively low suspicion for ACS, PE, PTX, PNA, pericarditis.   his chest pain is reproducible with palpation on exam suggestive of possible costochondritis/musculoskeletal etiolgoy further supported by improvement with tylenol.  - troponins negative  - EKG NSR with no ischemic changes  - Cardiology following, recs appreciated  - CT Coronaries performed with no abnormalities  - TTE also performed yesterday, awaiting results but will not change plan   - c/w tylenol PRN. encouraged him to use more as he needs.   - offered lidocaine patch, but states didn't make a difference. tylenol helps best per patient atypical in nature -- relatively low suspicion for ACS, PE, PTX, PNA, pericarditis.   his chest pain is reproducible with palpation on exam suggestive of possible costochondritis/musculoskeletal etiolgoy further supported by improvement with tylenol.  - troponins negative  - EKG NSR with no ischemic changes  - Cardiology following, recs appreciated  - CT Coronaries performed with no abnormalities  - TTE results reviewed, unremarkable with no WMA  - c/w tylenol PRN. encouraged him to use more as he needs.   - offered lidocaine patch, but states didn't make a difference. tylenol helps best per patient

## 2022-08-03 ENCOUNTER — EMERGENCY (EMERGENCY)
Facility: HOSPITAL | Age: 72
LOS: 1 days | Discharge: HOME | End: 2022-08-03
Attending: STUDENT IN AN ORGANIZED HEALTH CARE EDUCATION/TRAINING PROGRAM
Payer: COMMERCIAL

## 2022-08-03 VITALS
OXYGEN SATURATION: 97 % | RESPIRATION RATE: 18 BRPM | WEIGHT: 197.98 LBS | SYSTOLIC BLOOD PRESSURE: 144 MMHG | HEIGHT: 71 IN | HEART RATE: 76 BPM | TEMPERATURE: 98 F | DIASTOLIC BLOOD PRESSURE: 90 MMHG

## 2022-08-03 VITALS
SYSTOLIC BLOOD PRESSURE: 127 MMHG | DIASTOLIC BLOOD PRESSURE: 80 MMHG | TEMPERATURE: 98 F | OXYGEN SATURATION: 95 % | RESPIRATION RATE: 18 BRPM | HEART RATE: 87 BPM

## 2022-08-03 PROBLEM — J30.2 OTHER SEASONAL ALLERGIC RHINITIS: Chronic | Status: ACTIVE | Noted: 2021-07-24

## 2022-08-03 LAB
ALBUMIN SERPL ELPH-MCNC: 4.4 G/DL — SIGNIFICANT CHANGE UP (ref 3.3–5)
ALP SERPL-CCNC: 73 U/L — SIGNIFICANT CHANGE UP (ref 40–120)
ALT FLD-CCNC: 16 U/L — SIGNIFICANT CHANGE UP (ref 10–45)
ANION GAP SERPL CALC-SCNC: 12 MMOL/L — SIGNIFICANT CHANGE UP (ref 5–17)
APTT BLD: 29.5 SEC — SIGNIFICANT CHANGE UP (ref 27.5–35.5)
AST SERPL-CCNC: 33 U/L — SIGNIFICANT CHANGE UP (ref 10–40)
BASOPHILS # BLD AUTO: 0.04 K/UL — SIGNIFICANT CHANGE UP (ref 0–0.2)
BASOPHILS NFR BLD AUTO: 0.6 % — SIGNIFICANT CHANGE UP (ref 0–2)
BILIRUB SERPL-MCNC: 0.4 MG/DL — SIGNIFICANT CHANGE UP (ref 0.2–1.2)
BUN SERPL-MCNC: 10 MG/DL — SIGNIFICANT CHANGE UP (ref 7–23)
CALCIUM SERPL-MCNC: 9.4 MG/DL — SIGNIFICANT CHANGE UP (ref 8.4–10.5)
CHLORIDE SERPL-SCNC: 98 MMOL/L — SIGNIFICANT CHANGE UP (ref 96–108)
CO2 SERPL-SCNC: 24 MMOL/L — SIGNIFICANT CHANGE UP (ref 22–31)
CREAT SERPL-MCNC: 0.8 MG/DL — SIGNIFICANT CHANGE UP (ref 0.5–1.3)
EGFR: 95 ML/MIN/1.73M2 — SIGNIFICANT CHANGE UP
EOSINOPHIL # BLD AUTO: 0.09 K/UL — SIGNIFICANT CHANGE UP (ref 0–0.5)
EOSINOPHIL NFR BLD AUTO: 1.3 % — SIGNIFICANT CHANGE UP (ref 0–6)
GLUCOSE SERPL-MCNC: 90 MG/DL — SIGNIFICANT CHANGE UP (ref 70–99)
HCT VFR BLD CALC: 46 % — SIGNIFICANT CHANGE UP (ref 39–50)
HGB BLD-MCNC: 14.2 G/DL — SIGNIFICANT CHANGE UP (ref 13–17)
IMM GRANULOCYTES NFR BLD AUTO: 0.4 % — SIGNIFICANT CHANGE UP (ref 0–1.5)
INR BLD: 1.16 RATIO — SIGNIFICANT CHANGE UP (ref 0.88–1.16)
LYMPHOCYTES # BLD AUTO: 1.02 K/UL — SIGNIFICANT CHANGE UP (ref 1–3.3)
LYMPHOCYTES # BLD AUTO: 14.6 % — SIGNIFICANT CHANGE UP (ref 13–44)
MCHC RBC-ENTMCNC: 24.1 PG — LOW (ref 27–34)
MCHC RBC-ENTMCNC: 30.9 GM/DL — LOW (ref 32–36)
MCV RBC AUTO: 78 FL — LOW (ref 80–100)
MONOCYTES # BLD AUTO: 0.7 K/UL — SIGNIFICANT CHANGE UP (ref 0–0.9)
MONOCYTES NFR BLD AUTO: 10 % — SIGNIFICANT CHANGE UP (ref 2–14)
NEUTROPHILS # BLD AUTO: 5.13 K/UL — SIGNIFICANT CHANGE UP (ref 1.8–7.4)
NEUTROPHILS NFR BLD AUTO: 73.1 % — SIGNIFICANT CHANGE UP (ref 43–77)
NRBC # BLD: 0 /100 WBCS — SIGNIFICANT CHANGE UP (ref 0–0)
NT-PROBNP SERPL-SCNC: 13 PG/ML — SIGNIFICANT CHANGE UP (ref 0–300)
PLATELET # BLD AUTO: 209 K/UL — SIGNIFICANT CHANGE UP (ref 150–400)
POTASSIUM SERPL-MCNC: 5.4 MMOL/L — HIGH (ref 3.5–5.3)
POTASSIUM SERPL-SCNC: 5.4 MMOL/L — HIGH (ref 3.5–5.3)
PROT SERPL-MCNC: 7.8 G/DL — SIGNIFICANT CHANGE UP (ref 6–8.3)
PROTHROM AB SERPL-ACNC: 13.5 SEC — HIGH (ref 10.5–13.4)
RBC # BLD: 5.9 M/UL — HIGH (ref 4.2–5.8)
RBC # FLD: 16.9 % — HIGH (ref 10.3–14.5)
SODIUM SERPL-SCNC: 134 MMOL/L — LOW (ref 135–145)
TROPONIN T, HIGH SENSITIVITY RESULT: 8 NG/L — SIGNIFICANT CHANGE UP (ref 0–51)
WBC # BLD: 7.01 K/UL — SIGNIFICANT CHANGE UP (ref 3.8–10.5)
WBC # FLD AUTO: 7.01 K/UL — SIGNIFICANT CHANGE UP (ref 3.8–10.5)

## 2022-08-03 PROCEDURE — 83880 ASSAY OF NATRIURETIC PEPTIDE: CPT

## 2022-08-03 PROCEDURE — 85730 THROMBOPLASTIN TIME PARTIAL: CPT

## 2022-08-03 PROCEDURE — 85025 COMPLETE CBC W/AUTO DIFF WBC: CPT

## 2022-08-03 PROCEDURE — 99285 EMERGENCY DEPT VISIT HI MDM: CPT

## 2022-08-03 PROCEDURE — 71046 X-RAY EXAM CHEST 2 VIEWS: CPT

## 2022-08-03 PROCEDURE — 80053 COMPREHEN METABOLIC PANEL: CPT

## 2022-08-03 PROCEDURE — 85610 PROTHROMBIN TIME: CPT

## 2022-08-03 PROCEDURE — 99285 EMERGENCY DEPT VISIT HI MDM: CPT | Mod: 25

## 2022-08-03 PROCEDURE — 93005 ELECTROCARDIOGRAM TRACING: CPT

## 2022-08-03 PROCEDURE — 84484 ASSAY OF TROPONIN QUANT: CPT

## 2022-08-03 PROCEDURE — 71046 X-RAY EXAM CHEST 2 VIEWS: CPT | Mod: 26

## 2022-08-03 RX ORDER — IBUPROFEN 200 MG
400 TABLET ORAL ONCE
Refills: 0 | Status: COMPLETED | OUTPATIENT
Start: 2022-08-03 | End: 2022-08-03

## 2022-08-03 RX ORDER — LIDOCAINE 4 G/100G
1 CREAM TOPICAL ONCE
Refills: 0 | Status: COMPLETED | OUTPATIENT
Start: 2022-08-03 | End: 2022-08-03

## 2022-08-03 RX ORDER — ASPIRIN/CALCIUM CARB/MAGNESIUM 324 MG
324 TABLET ORAL ONCE
Refills: 0 | Status: COMPLETED | OUTPATIENT
Start: 2022-08-03 | End: 2022-08-03

## 2022-08-03 RX ADMIN — Medication 400 MILLIGRAM(S): at 14:57

## 2022-08-03 RX ADMIN — Medication 324 MILLIGRAM(S): at 13:40

## 2022-08-03 RX ADMIN — LIDOCAINE 1 PATCH: 4 CREAM TOPICAL at 14:57

## 2022-08-03 NOTE — ED PROVIDER NOTE - ATTENDING APP SHARED VISIT CONTRIBUTION OF CARE
I, Nathaniel Klein, performed a history and physical exam of the patient and discussed their management with the resident and/or advanced care provider. I reviewed the resident and/or advanced care provider's note and agree with the documented findings and plan of care. I was present and available for all procedures.    70yo M with PMH of GERD, ?HTN, ?asthma, presenting with L-sided CP that woke him from sleep at 11pm last night. Reports pain is non-radiating, non-exertional. Took 2 tylenol this morning with no relief. Reports pain is worse with palpation. Has not had CP since admission July 2021, found to have non-obstructive CAD at that time, has not seen a cardiologist since then.  Reports he sleeps with 2 pillows 2/2 vertigo, but does report chronically feels SOB. Denies any fever/chills, recent illness, cough, palpitations, diaphoresis, abdominal pain, n/v, LE pain/swelling, recent travel. no FHx of cardiac disease. Non-smoker. not pleuritic, no hemoptysis no recent procedures no hx of pe dvt     Well appearing and in NAD, head normal appearing atraumatic, trachea midline, no respiratory distress, lungs cta bilaterally, left pec reproducable ttp without skin findings, rrr no murmurs, soft NT ND abdomen, no visible extremity deformities, Alert and oriented, non focal neuro exam, skin warm and dry, normal affect and mood no leg swelling jvd    pw chest pain, ekg. pt on monitor, enzymes, aspirin, cxr for pneumothorax or infiltrates, less likely PE with low risk on wells, unlikely dissection will disposition based on heart score     well appearing

## 2022-08-03 NOTE — ED PROVIDER NOTE - NS ED ATTENDING STATEMENT MOD
This was a shared visit with the KAVEH. I reviewed and verified the documentation and independently performed the documented:

## 2022-08-03 NOTE — ED PROVIDER NOTE - MUSCULOSKELETAL, MLM
+ L chest wall ttp under L breast, no skin changes; Spine appears normal, range of motion is not limited, no muscle or joint tenderness. No LE edema. + L chest wall ttp inferior to L breast, no skin changes; Spine appears normal, range of motion is not limited, no muscle or joint tenderness. No LE edema.

## 2022-08-03 NOTE — ED PROVIDER NOTE - OBJECTIVE STATEMENT
72yo M with PMH of GERD, ?HTN, ?asthma, presenting with L-sided CP that woke him from sleep at 11pm last night. Reports pain is non-radiating, non-exertional. Took 2 tylenol this morning with no relief. Reports pain is worse with palpation. Has not had CP since admission July 2021, found to have non-obstructive CAD at that time, has not seen a cardiologist since then.  Reports he sleeps with 2 pillows 2/2 vertigo, but does report chronically feels SOB. Denies any fever/chills, recent illness, cough, palpitations, diaphoresis, abdominal pain, n/v, LE pain/swelling, recent travel. no FHx of cardiac disease. Non-smoker.

## 2022-08-03 NOTE — ED PROVIDER NOTE - PATIENT PORTAL LINK FT
You can access the FollowMyHealth Patient Portal offered by [facility] by registering at the following website: [email or web address]. By joining [employer]’s FollowMyHealth portal, you will also be able to view your health information using other applications (apps) compatible with our system.

## 2022-08-03 NOTE — ED ADULT NURSE REASSESSMENT NOTE - NS ED NURSE REASSESS COMMENT FT1
pt ok to eat as per MD Klein. Provided with food by family. Remains on cardiac monitor at this time. Pt well appearing, in no current distress

## 2022-08-03 NOTE — ED ADULT NURSE NOTE - OBJECTIVE STATEMENT
70 y/o Male presenting to the ED ambulatory, A&Ox3, complaining of left sided chest pain starting last night waking him up out of his sleep. Pt woke up this morning and reports the pain was gone but it is back at this time. Pt hx of vertigo and sleep apnea so he sleeps on his side with two pillows. Pt denies trauma to the area, SOB, dizziness, headache, syncope, diaphoresis, cough, fever, chills. Pt hx of these same episodes with no diagnosis. Pt well appearing, in no current distress. EKG completed. Cardiac monitor in place showing NSR. Safety and comfort measures provided, bed locked and in lowest position, side rails up for safety. Call bell within reach. Awaiting MD evaluation.

## 2022-08-03 NOTE — ED PROVIDER NOTE - NSFOLLOWUPINSTRUCTIONS_ED_ALL_ED_FT
Rest. Stay well hydrated.   Take Ibuprofen (i.e. Motrin, Advil) 600mg every 8 hrs for pain as needed. Take with food.   May alternate with Acetaminophen (Tylenol) 650mg every 6 hours for pain as needed.     May keep lidoderm patch on for up to 12 hours. Do not use more than 1 patch in a 24 hour period.     Take all of your other medications as previously prescribed.     Please follow up with your Primary Care Provider and Cardiologist in office this week for further evaluation and management.  You may follow up with [facility], please call [phone] to make an appointment.     Show copies of your reports given to you.      Return to ER for any new/worsening or continued chest pain, shortness of breath, weakness, passing out, or any other concerning symptoms.

## 2022-08-03 NOTE — ED PROVIDER NOTE - CARE PROVIDER_API CALL
Michael Esquivel (DO)  Cardiovascular Disease; Internal Medicine; Nuclear Cardiology  [address] [place] [address], [place] [address]  Phone: [phone]  Fax: [phone]  Follow Up Time: 1-3 Days

## 2025-01-08 ENCOUNTER — EMERGENCY (EMERGENCY)
Facility: HOSPITAL | Age: 75
LOS: 1 days | Discharge: HOME | End: 2025-01-08
Attending: EMERGENCY MEDICINE
Payer: MEDICARE

## 2025-01-08 VITALS
HEIGHT: 70 IN | DIASTOLIC BLOOD PRESSURE: 102 MMHG | RESPIRATION RATE: 20 BRPM | HEART RATE: 101 BPM | WEIGHT: 220.02 LBS | TEMPERATURE: 98 F | OXYGEN SATURATION: 94 % | SYSTOLIC BLOOD PRESSURE: 191 MMHG

## 2025-01-08 VITALS
SYSTOLIC BLOOD PRESSURE: 133 MMHG | OXYGEN SATURATION: 94 % | HEART RATE: 75 BPM | TEMPERATURE: 98 F | DIASTOLIC BLOOD PRESSURE: 90 MMHG | RESPIRATION RATE: 18 BRPM

## 2025-01-08 LAB
ALBUMIN SERPL ELPH-MCNC: 4.2 G/DL — SIGNIFICANT CHANGE UP (ref 3.3–5)
ALP SERPL-CCNC: 84 U/L — SIGNIFICANT CHANGE UP (ref 40–120)
ALT FLD-CCNC: 14 U/L — SIGNIFICANT CHANGE UP (ref 10–45)
ANION GAP SERPL CALC-SCNC: 9 MMOL/L — SIGNIFICANT CHANGE UP (ref 5–17)
APTT BLD: 29.6 SEC — SIGNIFICANT CHANGE UP (ref 24.5–35.6)
AST SERPL-CCNC: 12 U/L — SIGNIFICANT CHANGE UP (ref 10–40)
BASOPHILS # BLD AUTO: 0.04 K/UL — SIGNIFICANT CHANGE UP (ref 0–0.2)
BASOPHILS NFR BLD AUTO: 0.5 % — SIGNIFICANT CHANGE UP (ref 0–2)
BILIRUB SERPL-MCNC: 0.2 MG/DL — SIGNIFICANT CHANGE UP (ref 0.2–1.2)
BUN SERPL-MCNC: 14 MG/DL — SIGNIFICANT CHANGE UP (ref 7–23)
CALCIUM SERPL-MCNC: 9.6 MG/DL — SIGNIFICANT CHANGE UP (ref 8.4–10.5)
CHLORIDE SERPL-SCNC: 101 MMOL/L — SIGNIFICANT CHANGE UP (ref 96–108)
CO2 SERPL-SCNC: 27 MMOL/L — SIGNIFICANT CHANGE UP (ref 22–31)
CREAT SERPL-MCNC: 1 MG/DL — SIGNIFICANT CHANGE UP (ref 0.5–1.3)
EGFR: 79 ML/MIN/1.73M2 — SIGNIFICANT CHANGE UP
EGFR: 79 ML/MIN/1.73M2 — SIGNIFICANT CHANGE UP
EOSINOPHIL # BLD AUTO: 0.12 K/UL — SIGNIFICANT CHANGE UP (ref 0–0.5)
EOSINOPHIL NFR BLD AUTO: 1.5 % — SIGNIFICANT CHANGE UP (ref 0–6)
GAS PNL BLDV: SIGNIFICANT CHANGE UP
GLUCOSE SERPL-MCNC: 101 MG/DL — HIGH (ref 70–99)
HCT VFR BLD CALC: 47.6 % — SIGNIFICANT CHANGE UP (ref 39–50)
HGB BLD-MCNC: 14.6 G/DL — SIGNIFICANT CHANGE UP (ref 13–17)
IMM GRANULOCYTES NFR BLD AUTO: 0.4 % — SIGNIFICANT CHANGE UP (ref 0–0.9)
INR BLD: 1.05 RATIO — SIGNIFICANT CHANGE UP (ref 0.85–1.16)
LYMPHOCYTES # BLD AUTO: 1.42 K/UL — SIGNIFICANT CHANGE UP (ref 1–3.3)
LYMPHOCYTES # BLD AUTO: 18.2 % — SIGNIFICANT CHANGE UP (ref 13–44)
MCHC RBC-ENTMCNC: 24.8 PG — LOW (ref 27–34)
MCHC RBC-ENTMCNC: 30.7 G/DL — LOW (ref 32–36)
MCV RBC AUTO: 80.8 FL — SIGNIFICANT CHANGE UP (ref 80–100)
MONOCYTES # BLD AUTO: 0.72 K/UL — SIGNIFICANT CHANGE UP (ref 0–0.9)
MONOCYTES NFR BLD AUTO: 9.2 % — SIGNIFICANT CHANGE UP (ref 2–14)
NEUTROPHILS # BLD AUTO: 5.46 K/UL — SIGNIFICANT CHANGE UP (ref 1.8–7.4)
NEUTROPHILS NFR BLD AUTO: 70.2 % — SIGNIFICANT CHANGE UP (ref 43–77)
NRBC # BLD: 0 /100 WBCS — SIGNIFICANT CHANGE UP (ref 0–0)
NRBC BLD-RTO: 0 /100 WBCS — SIGNIFICANT CHANGE UP (ref 0–0)
NT-PROBNP SERPL-SCNC: <36 PG/ML — SIGNIFICANT CHANGE UP (ref 0–300)
PLATELET # BLD AUTO: 258 K/UL — SIGNIFICANT CHANGE UP (ref 150–400)
POTASSIUM SERPL-MCNC: 4.6 MMOL/L — SIGNIFICANT CHANGE UP (ref 3.5–5.3)
POTASSIUM SERPL-SCNC: 4.6 MMOL/L — SIGNIFICANT CHANGE UP (ref 3.5–5.3)
PROT SERPL-MCNC: 7.3 G/DL — SIGNIFICANT CHANGE UP (ref 6–8.3)
PROTHROM AB SERPL-ACNC: 12 SEC — SIGNIFICANT CHANGE UP (ref 9.9–13.4)
RBC # BLD: 5.89 M/UL — HIGH (ref 4.2–5.8)
RBC # FLD: 15 % — HIGH (ref 10.3–14.5)
SODIUM SERPL-SCNC: 137 MMOL/L — SIGNIFICANT CHANGE UP (ref 135–145)
TROPONIN T, HIGH SENSITIVITY RESULT: 8 NG/L — SIGNIFICANT CHANGE UP (ref 0–51)
WBC # BLD: 7.79 K/UL — SIGNIFICANT CHANGE UP (ref 3.8–10.5)
WBC # FLD AUTO: 7.79 K/UL — SIGNIFICANT CHANGE UP (ref 3.8–10.5)

## 2025-01-08 PROCEDURE — 85018 HEMOGLOBIN: CPT

## 2025-01-08 PROCEDURE — 82947 ASSAY GLUCOSE BLOOD QUANT: CPT

## 2025-01-08 PROCEDURE — 99284 EMERGENCY DEPT VISIT MOD MDM: CPT

## 2025-01-08 PROCEDURE — 96375 TX/PRO/DX INJ NEW DRUG ADDON: CPT

## 2025-01-08 PROCEDURE — 84132 ASSAY OF SERUM POTASSIUM: CPT

## 2025-01-08 PROCEDURE — 96374 THER/PROPH/DIAG INJ IV PUSH: CPT

## 2025-01-08 PROCEDURE — 84484 ASSAY OF TROPONIN QUANT: CPT

## 2025-01-08 PROCEDURE — 84295 ASSAY OF SERUM SODIUM: CPT

## 2025-01-08 PROCEDURE — 83880 ASSAY OF NATRIURETIC PEPTIDE: CPT

## 2025-01-08 PROCEDURE — 71046 X-RAY EXAM CHEST 2 VIEWS: CPT | Mod: 26

## 2025-01-08 PROCEDURE — 80053 COMPREHEN METABOLIC PANEL: CPT

## 2025-01-08 PROCEDURE — 85014 HEMATOCRIT: CPT

## 2025-01-08 PROCEDURE — 93005 ELECTROCARDIOGRAM TRACING: CPT

## 2025-01-08 PROCEDURE — 82803 BLOOD GASES ANY COMBINATION: CPT

## 2025-01-08 PROCEDURE — 73030 X-RAY EXAM OF SHOULDER: CPT

## 2025-01-08 PROCEDURE — 83605 ASSAY OF LACTIC ACID: CPT

## 2025-01-08 PROCEDURE — 85730 THROMBOPLASTIN TIME PARTIAL: CPT

## 2025-01-08 PROCEDURE — 85025 COMPLETE CBC W/AUTO DIFF WBC: CPT

## 2025-01-08 PROCEDURE — 73030 X-RAY EXAM OF SHOULDER: CPT | Mod: 26,LT

## 2025-01-08 PROCEDURE — 85610 PROTHROMBIN TIME: CPT

## 2025-01-08 PROCEDURE — 82330 ASSAY OF CALCIUM: CPT

## 2025-01-08 PROCEDURE — 82435 ASSAY OF BLOOD CHLORIDE: CPT

## 2025-01-08 PROCEDURE — 73060 X-RAY EXAM OF HUMERUS: CPT

## 2025-01-08 PROCEDURE — 99285 EMERGENCY DEPT VISIT HI MDM: CPT | Mod: 25

## 2025-01-08 PROCEDURE — 71046 X-RAY EXAM CHEST 2 VIEWS: CPT

## 2025-01-08 PROCEDURE — 36415 COLL VENOUS BLD VENIPUNCTURE: CPT

## 2025-01-08 PROCEDURE — 73060 X-RAY EXAM OF HUMERUS: CPT | Mod: 26,LT
